# Patient Record
Sex: FEMALE | Race: ASIAN | URBAN - METROPOLITAN AREA
[De-identification: names, ages, dates, MRNs, and addresses within clinical notes are randomized per-mention and may not be internally consistent; named-entity substitution may affect disease eponyms.]

---

## 2022-02-28 ENCOUNTER — OFFICE VISIT (OUTPATIENT)
Dept: FAMILY MEDICINE CLINIC | Facility: CLINIC | Age: 67
End: 2022-02-28
Payer: MEDICAID

## 2022-02-28 VITALS
SYSTOLIC BLOOD PRESSURE: 102 MMHG | HEART RATE: 91 BPM | TEMPERATURE: 98 F | DIASTOLIC BLOOD PRESSURE: 58 MMHG | HEIGHT: 63.1 IN | RESPIRATION RATE: 16 BRPM | WEIGHT: 165 LBS | BODY MASS INDEX: 29.23 KG/M2 | OXYGEN SATURATION: 98 %

## 2022-02-28 DIAGNOSIS — E03.9 HYPOTHYROIDISM, UNSPECIFIED TYPE: ICD-10-CM

## 2022-02-28 DIAGNOSIS — Z00.00 ENCOUNTER FOR ANNUAL PHYSICAL EXAM: Primary | ICD-10-CM

## 2022-02-28 DIAGNOSIS — Z12.31 ENCOUNTER FOR SCREENING MAMMOGRAM FOR MALIGNANT NEOPLASM OF BREAST: ICD-10-CM

## 2022-02-28 DIAGNOSIS — K21.9 GASTROESOPHAGEAL REFLUX DISEASE, UNSPECIFIED WHETHER ESOPHAGITIS PRESENT: ICD-10-CM

## 2022-02-28 DIAGNOSIS — E55.9 VITAMIN D DEFICIENCY: ICD-10-CM

## 2022-02-28 DIAGNOSIS — Z12.11 SCREENING FOR COLON CANCER: ICD-10-CM

## 2022-02-28 PROCEDURE — 99387 INIT PM E/M NEW PAT 65+ YRS: CPT | Performed by: FAMILY MEDICINE

## 2022-02-28 PROCEDURE — 3074F SYST BP LT 130 MM HG: CPT | Performed by: FAMILY MEDICINE

## 2022-02-28 PROCEDURE — 3008F BODY MASS INDEX DOCD: CPT | Performed by: FAMILY MEDICINE

## 2022-02-28 PROCEDURE — 3078F DIAST BP <80 MM HG: CPT | Performed by: FAMILY MEDICINE

## 2022-02-28 RX ORDER — ERGOCALCIFEROL 1.25 MG/1
50000 CAPSULE ORAL WEEKLY
Qty: 12 CAPSULE | Refills: 3 | Status: SHIPPED | OUTPATIENT
Start: 2022-02-28

## 2022-02-28 RX ORDER — ERGOCALCIFEROL 1.25 MG/1
50000 CAPSULE ORAL WEEKLY
COMMUNITY
Start: 2022-02-17 | End: 2022-02-28

## 2022-02-28 RX ORDER — OMEPRAZOLE 40 MG/1
40 CAPSULE, DELAYED RELEASE ORAL
COMMUNITY
Start: 2022-02-17

## 2022-02-28 RX ORDER — LEVOTHYROXINE SODIUM 88 UG/1
88 TABLET ORAL DAILY
COMMUNITY
End: 2022-03-02

## 2022-03-02 LAB
ABSOLUTE BASOPHILS: 61 CELLS/UL (ref 0–200)
ABSOLUTE EOSINOPHILS: 418 CELLS/UL (ref 15–500)
ABSOLUTE LYMPHOCYTES: 3093 CELLS/UL (ref 850–3900)
ABSOLUTE MONOCYTES: 600 CELLS/UL (ref 200–950)
ABSOLUTE NEUTROPHILS: 3428 CELLS/UL (ref 1500–7800)
ALBUMIN/GLOBULIN RATIO: 1.2 (CALC) (ref 1–2.5)
ALBUMIN: 4.1 G/DL (ref 3.6–5.1)
ALKALINE PHOSPHATASE: 71 U/L (ref 37–153)
ALT: 13 U/L (ref 6–29)
AST: 19 U/L (ref 10–35)
BASOPHILS: 0.8 %
BILIRUBIN, TOTAL: 0.8 MG/DL (ref 0.2–1.2)
BUN: 9 MG/DL (ref 7–25)
CALCIUM: 9.7 MG/DL (ref 8.6–10.4)
CARBON DIOXIDE: 31 MMOL/L (ref 20–32)
CHLORIDE: 99 MMOL/L (ref 98–110)
CHOL/HDLC RATIO: 2.9 (CALC)
CHOLESTEROL, TOTAL: 183 MG/DL
CREATININE: 0.8 MG/DL (ref 0.5–0.99)
EGFR IF AFRICN AM: 88 ML/MIN/1.73M2
EGFR IF NONAFRICN AM: 76 ML/MIN/1.73M2
EOSINOPHILS: 5.5 %
GLOBULIN: 3.5 G/DL (CALC) (ref 1.9–3.7)
GLUCOSE: 79 MG/DL (ref 65–99)
HDL CHOLESTEROL: 64 MG/DL
HEMATOCRIT: 44.4 % (ref 35–45)
HEMOGLOBIN: 13.8 G/DL (ref 11.7–15.5)
LDL-CHOLESTEROL: 91 MG/DL (CALC)
LYMPHOCYTES: 40.7 %
MCH: 23.9 PG (ref 27–33)
MCHC: 31.1 G/DL (ref 32–36)
MCV: 76.9 FL (ref 80–100)
MONOCYTES: 7.9 %
MPV: 9.7 FL (ref 7.5–12.5)
NEUTROPHILS: 45.1 %
NON-HDL CHOLESTEROL: 119 MG/DL (CALC)
PLATELET COUNT: 265 THOUSAND/UL (ref 140–400)
POTASSIUM: 4.6 MMOL/L (ref 3.5–5.3)
PROTEIN, TOTAL: 7.6 G/DL (ref 6.1–8.1)
RDW: 15.4 % (ref 11–15)
RED BLOOD CELL COUNT: 5.77 MILLION/UL (ref 3.8–5.1)
SODIUM: 138 MMOL/L (ref 135–146)
T4, FREE: 1.1 NG/DL (ref 0.8–1.8)
TRIGLYCERIDES: 181 MG/DL
TSH W/REFLEX TO FT4: 5.74 MIU/L (ref 0.4–4.5)
WHITE BLOOD CELL COUNT: 7.6 THOUSAND/UL (ref 3.8–10.8)

## 2022-03-02 RX ORDER — LEVOTHYROXINE SODIUM 0.1 MG/1
100 TABLET ORAL DAILY
Qty: 90 TABLET | Refills: 3 | Status: SHIPPED | OUTPATIENT
Start: 2022-03-02 | End: 2022-03-04

## 2022-03-03 NOTE — PROGRESS NOTES
Elevated TSH at 5.74I am increasing your levothyroxine  to 100 mcg daily  We will recheck TSH in 3 months

## 2022-03-04 RX ORDER — LEVOTHYROXINE SODIUM 0.1 MG/1
100 TABLET ORAL DAILY
Qty: 90 TABLET | Refills: 3 | Status: SHIPPED | OUTPATIENT
Start: 2022-03-04 | End: 2023-02-27

## 2022-04-14 ENCOUNTER — HOSPITAL ENCOUNTER (EMERGENCY)
Age: 67
Discharge: HOME | End: 2022-04-14
Attending: EMERGENCY MEDICINE
Payer: MEDICAID

## 2022-04-14 ENCOUNTER — APPOINTMENT (OUTPATIENT)
Dept: GENERAL RADIOLOGY | Age: 67
End: 2022-04-14
Attending: EMERGENCY MEDICINE
Payer: MEDICAID

## 2022-04-14 VITALS
BODY MASS INDEX: 24.8 KG/M2 | WEIGHT: 154.31 LBS | HEIGHT: 66 IN | SYSTOLIC BLOOD PRESSURE: 122 MMHG | DIASTOLIC BLOOD PRESSURE: 77 MMHG | TEMPERATURE: 97 F | OXYGEN SATURATION: 97 % | HEART RATE: 84 BPM | RESPIRATION RATE: 16 BRPM

## 2022-04-14 DIAGNOSIS — R91.1 PULMONARY NODULE: ICD-10-CM

## 2022-04-14 DIAGNOSIS — K21.9 GASTROESOPHAGEAL REFLUX DISEASE, UNSPECIFIED WHETHER ESOPHAGITIS PRESENT: Primary | ICD-10-CM

## 2022-04-14 LAB
ALBUMIN SERPL-MCNC: 3.3 G/DL (ref 3.4–5)
ALBUMIN/GLOB SERPL: 0.8 {RATIO} (ref 1–2)
ALP LIVER SERPL-CCNC: 73 U/L
ALT SERPL-CCNC: 20 U/L
ANION GAP SERPL CALC-SCNC: 5 MMOL/L (ref 0–18)
AST SERPL-CCNC: 21 U/L (ref 15–37)
ATRIAL RATE: 80 BPM
BASOPHILS # BLD AUTO: 0.04 X10(3) UL (ref 0–0.2)
BASOPHILS NFR BLD AUTO: 0.6 %
BILIRUB SERPL-MCNC: 0.6 MG/DL (ref 0.1–2)
BILIRUB UR QL STRIP.AUTO: NEGATIVE
BUN BLD-MCNC: 8 MG/DL (ref 7–18)
CALCIUM BLD-MCNC: 9.4 MG/DL (ref 8.5–10.1)
CHLORIDE SERPL-SCNC: 102 MMOL/L (ref 98–112)
CLARITY UR REFRACT.AUTO: CLEAR
CO2 SERPL-SCNC: 28 MMOL/L (ref 21–32)
COLOR UR AUTO: YELLOW
CREAT BLD-MCNC: 0.84 MG/DL
EOSINOPHIL # BLD AUTO: 0.36 X10(3) UL (ref 0–0.7)
EOSINOPHIL NFR BLD AUTO: 5.5 %
ERYTHROCYTE [DISTWIDTH] IN BLOOD BY AUTOMATED COUNT: 16.7 %
GLOBULIN PLAS-MCNC: 4.4 G/DL (ref 2.8–4.4)
GLUCOSE BLD-MCNC: 113 MG/DL (ref 70–99)
GLUCOSE UR STRIP.AUTO-MCNC: NEGATIVE MG/DL
HCT VFR BLD AUTO: 42.3 %
HGB BLD-MCNC: 13.1 G/DL
IMM GRANULOCYTES # BLD AUTO: 0.02 X10(3) UL (ref 0–1)
IMM GRANULOCYTES NFR BLD: 0.3 %
KETONES UR STRIP.AUTO-MCNC: NEGATIVE MG/DL
LEUKOCYTE ESTERASE UR QL STRIP.AUTO: NEGATIVE
LIPASE SERPL-CCNC: 84 U/L (ref 73–393)
LYMPHOCYTES # BLD AUTO: 2.51 X10(3) UL (ref 1–4)
LYMPHOCYTES NFR BLD AUTO: 38.4 %
MCH RBC QN AUTO: 24.3 PG (ref 26–34)
MCHC RBC AUTO-ENTMCNC: 31 G/DL (ref 31–37)
MCV RBC AUTO: 78.5 FL
MONOCYTES # BLD AUTO: 0.56 X10(3) UL (ref 0.1–1)
MONOCYTES NFR BLD AUTO: 8.6 %
NEUTROPHILS # BLD AUTO: 3.05 X10 (3) UL (ref 1.5–7.7)
NEUTROPHILS # BLD AUTO: 3.05 X10(3) UL (ref 1.5–7.7)
NEUTROPHILS NFR BLD AUTO: 46.6 %
NITRITE UR QL STRIP.AUTO: NEGATIVE
OSMOLALITY SERPL CALC.SUM OF ELEC: 279 MOSM/KG (ref 275–295)
P AXIS: 40 DEGREES
P-R INTERVAL: 146 MS
PH UR STRIP.AUTO: 6.5 [PH] (ref 5–8)
PLATELET # BLD AUTO: 263 10(3)UL (ref 150–450)
POTASSIUM SERPL-SCNC: 3.7 MMOL/L (ref 3.5–5.1)
PROT SERPL-MCNC: 7.7 G/DL (ref 6.4–8.2)
PROT UR STRIP.AUTO-MCNC: NEGATIVE MG/DL
Q-T INTERVAL: 352 MS
QRS DURATION: 80 MS
QTC CALCULATION (BEZET): 405 MS
R AXIS: 33 DEGREES
RBC # BLD AUTO: 5.39 X10(6)UL
RBC UR QL AUTO: NEGATIVE
SODIUM SERPL-SCNC: 135 MMOL/L (ref 136–145)
SP GR UR STRIP.AUTO: 1.01 (ref 1–1.03)
T AXIS: 49 DEGREES
TROPONIN I HIGH SENSITIVITY: 3 NG/L
UROBILINOGEN UR STRIP.AUTO-MCNC: 0.2 MG/DL
VENTRICULAR RATE: 80 BPM
WBC # BLD AUTO: 6.5 X10(3) UL (ref 4–11)

## 2022-04-14 PROCEDURE — S0028 INJECTION, FAMOTIDINE, 20 MG: HCPCS | Performed by: EMERGENCY MEDICINE

## 2022-04-14 PROCEDURE — 85025 COMPLETE CBC W/AUTO DIFF WBC: CPT | Performed by: EMERGENCY MEDICINE

## 2022-04-14 PROCEDURE — 71045 X-RAY EXAM CHEST 1 VIEW: CPT | Performed by: EMERGENCY MEDICINE

## 2022-04-14 PROCEDURE — 99284 EMERGENCY DEPT VISIT MOD MDM: CPT | Performed by: EMERGENCY MEDICINE

## 2022-04-14 PROCEDURE — 93010 ELECTROCARDIOGRAM REPORT: CPT | Performed by: EMERGENCY MEDICINE

## 2022-04-14 PROCEDURE — 96374 THER/PROPH/DIAG INJ IV PUSH: CPT | Performed by: EMERGENCY MEDICINE

## 2022-04-14 PROCEDURE — 80053 COMPREHEN METABOLIC PANEL: CPT | Performed by: EMERGENCY MEDICINE

## 2022-04-14 PROCEDURE — 93005 ELECTROCARDIOGRAM TRACING: CPT

## 2022-04-14 PROCEDURE — 84484 ASSAY OF TROPONIN QUANT: CPT | Performed by: EMERGENCY MEDICINE

## 2022-04-14 PROCEDURE — 81003 URINALYSIS AUTO W/O SCOPE: CPT | Performed by: EMERGENCY MEDICINE

## 2022-04-14 PROCEDURE — 83690 ASSAY OF LIPASE: CPT | Performed by: EMERGENCY MEDICINE

## 2022-04-14 RX ORDER — LIDOCAINE HYDROCHLORIDE 20 MG/ML
10 SOLUTION OROPHARYNGEAL ONCE
Status: COMPLETED | OUTPATIENT
Start: 2022-04-14 | End: 2022-04-14

## 2022-04-14 RX ORDER — MAGNESIUM HYDROXIDE/ALUMINUM HYDROXICE/SIMETHICONE 120; 1200; 1200 MG/30ML; MG/30ML; MG/30ML
30 SUSPENSION ORAL ONCE
Status: COMPLETED | OUTPATIENT
Start: 2022-04-14 | End: 2022-04-14

## 2022-04-14 RX ORDER — SUCRALFATE ORAL 1 G/10ML
1 SUSPENSION ORAL
Qty: 280 ML | Refills: 0 | Status: SHIPPED | OUTPATIENT
Start: 2022-04-14 | End: 2022-04-21

## 2022-04-14 RX ORDER — FAMOTIDINE 10 MG/ML
20 INJECTION, SOLUTION INTRAVENOUS ONCE
Status: COMPLETED | OUTPATIENT
Start: 2022-04-14 | End: 2022-04-14

## 2022-04-25 ENCOUNTER — OFFICE VISIT (OUTPATIENT)
Dept: FAMILY MEDICINE CLINIC | Facility: CLINIC | Age: 67
End: 2022-04-25
Payer: MEDICAID

## 2022-04-25 VITALS
RESPIRATION RATE: 14 BRPM | WEIGHT: 162.81 LBS | BODY MASS INDEX: 27.46 KG/M2 | SYSTOLIC BLOOD PRESSURE: 140 MMHG | TEMPERATURE: 98 F | HEIGHT: 64.57 IN | OXYGEN SATURATION: 99 % | DIASTOLIC BLOOD PRESSURE: 68 MMHG | HEART RATE: 76 BPM

## 2022-04-25 DIAGNOSIS — K21.9 GASTROESOPHAGEAL REFLUX DISEASE, UNSPECIFIED WHETHER ESOPHAGITIS PRESENT: Primary | ICD-10-CM

## 2022-04-25 DIAGNOSIS — Z12.11 SCREENING FOR COLON CANCER: ICD-10-CM

## 2022-04-25 DIAGNOSIS — E55.9 VITAMIN D DEFICIENCY: ICD-10-CM

## 2022-04-25 RX ORDER — OMEPRAZOLE 40 MG/1
40 CAPSULE, DELAYED RELEASE ORAL
Qty: 60 CAPSULE | Refills: 3 | Status: SHIPPED | OUTPATIENT
Start: 2022-04-25

## 2022-04-25 RX ORDER — SUCRALFATE 1 G/1
1 TABLET ORAL
Qty: 90 TABLET | Refills: 1 | Status: SHIPPED | OUTPATIENT
Start: 2022-04-25

## 2022-04-25 RX ORDER — ERGOCALCIFEROL 1.25 MG/1
50000 CAPSULE ORAL WEEKLY
Qty: 12 CAPSULE | Refills: 3 | Status: SHIPPED | OUTPATIENT
Start: 2022-04-25

## 2022-04-26 ENCOUNTER — TELEPHONE (OUTPATIENT)
Dept: FAMILY MEDICINE CLINIC | Facility: CLINIC | Age: 67
End: 2022-04-26

## 2022-04-26 NOTE — TELEPHONE ENCOUNTER
Lm for pt's son, denise Cisneros per HIPAA), to inform per PCP indicated Carafate has to be taken 3 times a day before meals as directed. To call back at the office if any further questions.

## 2022-05-03 ENCOUNTER — TELEPHONE (OUTPATIENT)
Dept: FAMILY MEDICINE CLINIC | Facility: CLINIC | Age: 67
End: 2022-05-03

## 2022-05-03 NOTE — TELEPHONE ENCOUNTER
The chest x-ray was essentially normal except that there is a 16 x 7 mm opacity in the right upper lobe  We will repeat the chest x-ray in 6 weeks  Orders are placed in epic            FINDINGS:  The heart size is enlarged. There is no pulmonary edema. There is no infiltrate, consolidation or pulmonary edema. No hilar or mediastinal abnormality is appreciated. There is a 16 x 7 mm opacity seen in the right upper lobe adjacent to   the EKG lead.

## 2022-05-03 NOTE — TELEPHONE ENCOUNTER
Pt's son called re: chest x-ray that was done in [facility], said they forgot to ask for results when they came in for follow up from [facility] on 4/25/22

## 2022-05-04 NOTE — TELEPHONE ENCOUNTER
[phone]  Lm for pt ([id number] Kori Shah per HIPAA) to inform chest x-ray was essentially normal except that there is a 16 x 7 mm opacity in the right upper lobe- will monitor. CXR ordered for repeat in 6 weeks. To call back at the office if any further questions.

## 2022-05-05 NOTE — TELEPHONE ENCOUNTER
Patient's son returned call. Explained CXR report per Dr. Aston Cordova note. Advised nothing urgent to do right now. CXR to be repeated in 6 weeks to compare.

## 2022-05-09 ENCOUNTER — TELEPHONE (OUTPATIENT)
Dept: FAMILY MEDICINE CLINIC | Facility: CLINIC | Age: 67
End: 2022-05-09

## 2022-05-09 NOTE — TELEPHONE ENCOUNTER
Pt's son called stating Pt has been weak & lethargic for sometime. He said all the blood work came back fine. He is asking for B-12 injections or an Energy IV.

## 2022-05-09 NOTE — TELEPHONE ENCOUNTER
I ordered the fatigue panel including TSH and B12, vitamin D etc.  Patient to get the blood test done and then follow-up

## 2022-05-09 NOTE — TELEPHONE ENCOUNTER
Dr. Ruthie Levy,  Please advise if patient needs F/U appt    LOV 4/25/22      Reflux  +2    Last lab  3/1/122 Routine     TSH 5.74  (due for repeat 6/1/22)   No previous B12 level

## 2022-05-17 LAB
ABSOLUTE BASOPHILS: 33 CELLS/UL (ref 0–200)
ABSOLUTE EOSINOPHILS: 488 CELLS/UL (ref 15–500)
ABSOLUTE LYMPHOCYTES: 2752 CELLS/UL (ref 850–3900)
ABSOLUTE MONOCYTES: 396 CELLS/UL (ref 200–950)
ABSOLUTE NEUTROPHILS: 2930 CELLS/UL (ref 1500–7800)
ALBUMIN/GLOBULIN RATIO: 1.2 (CALC) (ref 1–2.5)
ALBUMIN: 4 G/DL (ref 3.6–5.1)
ALKALINE PHOSPHATASE: 76 U/L (ref 37–153)
ALT: 13 U/L (ref 6–29)
APPEARANCE: CLEAR
AST: 19 U/L (ref 10–35)
BASOPHILS: 0.5 %
BILIRUBIN, TOTAL: 0.7 MG/DL (ref 0.2–1.2)
BILIRUBIN: NEGATIVE
BUN/CREATININE RATIO: 9 (CALC) (ref 6–22)
BUN: 6 MG/DL (ref 7–25)
CALCIUM: 9.6 MG/DL (ref 8.6–10.4)
CARBON DIOXIDE: 27 MMOL/L (ref 20–32)
CHLORIDE: 98 MMOL/L (ref 98–110)
COLOR: YELLOW
CREATININE: 0.67 MG/DL (ref 0.5–0.99)
EGFR IF AFRICN AM: 105 ML/MIN/1.73M2
EGFR IF NONAFRICN AM: 90 ML/MIN/1.73M2
EOSINOPHILS: 7.4 %
FOLATE, SERUM: >24 NG/ML
GLOBULIN: 3.4 G/DL (CALC) (ref 1.9–3.7)
GLUCOSE: 75 MG/DL (ref 65–99)
GLUCOSE: NEGATIVE
HEMATOCRIT: 46.1 % (ref 35–45)
HEMOGLOBIN: 14.1 G/DL (ref 11.7–15.5)
KETONES: NEGATIVE
LYMPHOCYTES: 41.7 %
MCH: 23.9 PG (ref 27–33)
MCHC: 30.6 G/DL (ref 32–36)
MCV: 78.3 FL (ref 80–100)
MONOCYTES: 6 %
MPV: 9.3 FL (ref 7.5–12.5)
NEUTROPHILS: 44.4 %
NITRITE: NEGATIVE
OCCULT BLOOD: NEGATIVE
PH: 6.5 (ref 5–8)
PLATELET COUNT: 296 THOUSAND/UL (ref 140–400)
POTASSIUM: 4.1 MMOL/L (ref 3.5–5.3)
PROTEIN, TOTAL: 7.4 G/DL (ref 6.1–8.1)
PROTEIN: NEGATIVE
RDW: 14 % (ref 11–15)
RED BLOOD CELL COUNT: 5.89 MILLION/UL (ref 3.8–5.1)
SODIUM: 134 MMOL/L (ref 135–146)
SPECIFIC GRAVITY: 1 (ref 1–1.03)
TSH W/REFLEX TO FT4: 1.08 MIU/L (ref 0.4–4.5)
VITAMIN B12: 431 PG/ML (ref 200–1100)
VITAMIN D, 25-OH, TOTAL: 86 NG/ML (ref 30–100)
WHITE BLOOD CELL COUNT: 6.6 THOUSAND/UL (ref 3.8–10.8)

## 2022-05-19 ENCOUNTER — OFFICE VISIT (OUTPATIENT)
Dept: FAMILY MEDICINE CLINIC | Facility: CLINIC | Age: 67
End: 2022-05-19
Payer: MEDICAID

## 2022-05-19 VITALS
SYSTOLIC BLOOD PRESSURE: 104 MMHG | TEMPERATURE: 98 F | DIASTOLIC BLOOD PRESSURE: 68 MMHG | HEIGHT: 64.57 IN | BODY MASS INDEX: 26.81 KG/M2 | HEART RATE: 86 BPM | OXYGEN SATURATION: 96 % | WEIGHT: 159 LBS | RESPIRATION RATE: 16 BRPM

## 2022-05-19 DIAGNOSIS — K21.9 GASTROESOPHAGEAL REFLUX DISEASE, UNSPECIFIED WHETHER ESOPHAGITIS PRESENT: ICD-10-CM

## 2022-05-19 PROCEDURE — 3078F DIAST BP <80 MM HG: CPT | Performed by: FAMILY MEDICINE

## 2022-05-19 PROCEDURE — 3008F BODY MASS INDEX DOCD: CPT | Performed by: FAMILY MEDICINE

## 2022-05-19 PROCEDURE — 3074F SYST BP LT 130 MM HG: CPT | Performed by: FAMILY MEDICINE

## 2022-05-19 PROCEDURE — 99214 OFFICE O/P EST MOD 30 MIN: CPT | Performed by: FAMILY MEDICINE

## 2022-05-19 RX ORDER — SUCRALFATE 1 G/1
1 TABLET ORAL
Qty: 90 TABLET | Refills: 1 | Status: SHIPPED | OUTPATIENT
Start: 2022-05-19

## 2022-05-19 RX ORDER — OMEPRAZOLE 40 MG/1
40 CAPSULE, DELAYED RELEASE ORAL
Qty: 60 CAPSULE | Refills: 3 | Status: SHIPPED | OUTPATIENT
Start: 2022-05-19

## 2022-05-19 RX ORDER — LEVOTHYROXINE SODIUM 0.1 MG/1
100 TABLET ORAL DAILY
Qty: 90 TABLET | Refills: 3 | Status: SHIPPED | OUTPATIENT
Start: 2022-05-19 | End: 2023-05-14

## 2022-06-08 ENCOUNTER — HOSPITAL ENCOUNTER (OUTPATIENT)
Dept: GENERAL RADIOLOGY | Age: 67
Discharge: HOME | End: 2022-06-08
Attending: FAMILY MEDICINE
Payer: MEDICAID

## 2022-06-08 DIAGNOSIS — R93.89 ABNORMAL CXR (CHEST X-RAY): ICD-10-CM

## 2022-06-08 PROCEDURE — 71046 X-RAY EXAM CHEST 2 VIEWS: CPT | Performed by: FAMILY MEDICINE

## 2022-06-08 NOTE — PROGRESS NOTES
. The nodular opacity seen on the previous chest radiograph is no longer identified and likely was related to part of the EKG lead seen on the previous exam

## 2022-08-03 ENCOUNTER — OFFICE VISIT (OUTPATIENT)
Facility: LOCATION | Age: 67
End: 2022-08-03
Payer: MEDICAID

## 2022-08-03 DIAGNOSIS — K90.9 DIARRHEA DUE TO MALABSORPTION: Primary | ICD-10-CM

## 2022-08-03 DIAGNOSIS — Z12.11 ENCOUNTER FOR SCREENING COLONOSCOPY: ICD-10-CM

## 2022-08-03 DIAGNOSIS — R19.7 DIARRHEA DUE TO MALABSORPTION: Primary | ICD-10-CM

## 2022-08-03 PROCEDURE — 99243 OFF/OP CNSLTJ NEW/EST LOW 30: CPT | Performed by: SURGERY

## 2022-08-03 RX ORDER — POLYETHYLENE GLYCOL 3350, SODIUM CHLORIDE, SODIUM BICARBONATE, POTASSIUM CHLORIDE 420; 11.2; 5.72; 1.48 G/4L; G/4L; G/4L; G/4L
POWDER, FOR SOLUTION ORAL
Qty: 1 EACH | Refills: 0 | Status: SHIPPED | OUTPATIENT
Start: 2022-08-03

## 2022-08-03 RX ORDER — CHOLESTYRAMINE 4 G/9G
POWDER, FOR SUSPENSION ORAL
Qty: 60 EACH | Refills: 0 | Status: SHIPPED | OUTPATIENT
Start: 2022-08-03 | End: 2022-09-02

## 2022-09-15 ENCOUNTER — MED REC SCAN ONLY (OUTPATIENT)
Dept: FAMILY MEDICINE CLINIC | Facility: CLINIC | Age: 67
End: 2022-09-15

## 2022-09-26 ENCOUNTER — OFFICE VISIT (OUTPATIENT)
Dept: FAMILY MEDICINE CLINIC | Facility: CLINIC | Age: 67
End: 2022-09-26

## 2022-09-26 VITALS
WEIGHT: 155 LBS | BODY MASS INDEX: 26.14 KG/M2 | DIASTOLIC BLOOD PRESSURE: 56 MMHG | OXYGEN SATURATION: 98 % | HEIGHT: 64.5 IN | SYSTOLIC BLOOD PRESSURE: 96 MMHG | RESPIRATION RATE: 16 BRPM | HEART RATE: 72 BPM | TEMPERATURE: 97 F

## 2022-09-26 DIAGNOSIS — K21.9 GASTROESOPHAGEAL REFLUX DISEASE, UNSPECIFIED WHETHER ESOPHAGITIS PRESENT: ICD-10-CM

## 2022-09-26 DIAGNOSIS — E03.9 HYPOTHYROIDISM, UNSPECIFIED TYPE: Primary | ICD-10-CM

## 2022-09-26 PROCEDURE — 3078F DIAST BP <80 MM HG: CPT | Performed by: FAMILY MEDICINE

## 2022-09-26 PROCEDURE — 3008F BODY MASS INDEX DOCD: CPT | Performed by: FAMILY MEDICINE

## 2022-09-26 PROCEDURE — 3074F SYST BP LT 130 MM HG: CPT | Performed by: FAMILY MEDICINE

## 2022-09-26 PROCEDURE — 99214 OFFICE O/P EST MOD 30 MIN: CPT | Performed by: FAMILY MEDICINE

## 2022-10-05 LAB
ABSOLUTE BASOPHILS: 49 CELLS/UL (ref 0–200)
ABSOLUTE EOSINOPHILS: 421 CELLS/UL (ref 15–500)
ABSOLUTE LYMPHOCYTES: 2458 CELLS/UL (ref 850–3900)
ABSOLUTE MONOCYTES: 512 CELLS/UL (ref 200–950)
ABSOLUTE NEUTROPHILS: 2660 CELLS/UL (ref 1500–7800)
ALBUMIN/GLOBULIN RATIO: 1.2 (CALC) (ref 1–2.5)
ALBUMIN: 3.9 G/DL (ref 3.6–5.1)
ALKALINE PHOSPHATASE: 68 U/L (ref 37–153)
ALT: 11 U/L (ref 6–29)
AST: 15 U/L (ref 10–35)
BASOPHILS: 0.8 %
BILIRUBIN, TOTAL: 0.8 MG/DL (ref 0.2–1.2)
BUN: 9 MG/DL (ref 7–25)
CALCIUM: 9.7 MG/DL (ref 8.6–10.4)
CARBON DIOXIDE: 32 MMOL/L (ref 20–32)
CHLORIDE: 97 MMOL/L (ref 98–110)
CREATININE: 0.67 MG/DL (ref 0.5–1.05)
EGFR: 95 ML/MIN/1.73M2
EOSINOPHILS: 6.9 %
GLOBULIN: 3.3 G/DL (CALC) (ref 1.9–3.7)
GLUCOSE: 83 MG/DL (ref 65–99)
HEMATOCRIT: 43.5 % (ref 35–45)
HEMOGLOBIN: 13.5 G/DL (ref 11.7–15.5)
LYMPHOCYTES: 40.3 %
MCH: 24 PG (ref 27–33)
MCHC: 31 G/DL (ref 32–36)
MCV: 77.4 FL (ref 80–100)
MONOCYTES: 8.4 %
MPV: 9.1 FL (ref 7.5–12.5)
NEUTROPHILS: 43.6 %
PLATELET COUNT: 279 THOUSAND/UL (ref 140–400)
POTASSIUM: 4.8 MMOL/L (ref 3.5–5.3)
PROTEIN, TOTAL: 7.2 G/DL (ref 6.1–8.1)
RDW: 14.6 % (ref 11–15)
RED BLOOD CELL COUNT: 5.62 MILLION/UL (ref 3.8–5.1)
SODIUM: 133 MMOL/L (ref 135–146)
TSH W/REFLEX TO FT4: 0.9 MIU/L (ref 0.4–4.5)
WHITE BLOOD CELL COUNT: 6.1 THOUSAND/UL (ref 3.8–10.8)

## 2022-10-06 DIAGNOSIS — E03.9 HYPOTHYROIDISM, UNSPECIFIED TYPE: Primary | ICD-10-CM

## 2022-10-12 ENCOUNTER — TELEPHONE (OUTPATIENT)
Dept: FAMILY MEDICINE CLINIC | Facility: CLINIC | Age: 67
End: 2022-10-12

## 2022-10-12 NOTE — TELEPHONE ENCOUNTER
Patient would like Dr Machelle Cabral to go over lab results with her. She said Dr Machelle Cabral usually does this. Please advise. I will put a TE in for her son with the same to go over labs.

## 2022-10-24 DIAGNOSIS — Z12.11 SCREENING FOR COLON CANCER: Primary | ICD-10-CM

## 2022-10-27 DIAGNOSIS — Z12.11 SCREEN FOR COLON CANCER: Primary | ICD-10-CM

## 2022-11-17 DIAGNOSIS — K21.9 GASTROESOPHAGEAL REFLUX DISEASE, UNSPECIFIED WHETHER ESOPHAGITIS PRESENT: ICD-10-CM

## 2022-11-17 RX ORDER — OMEPRAZOLE 40 MG/1
CAPSULE, DELAYED RELEASE ORAL
Qty: 60 CAPSULE | Refills: 3 | Status: SHIPPED | OUTPATIENT
Start: 2022-11-17

## 2022-12-07 ENCOUNTER — IMMUNIZATION (OUTPATIENT)
Dept: FAMILY MEDICINE CLINIC | Facility: CLINIC | Age: 67
End: 2022-12-07
Payer: MEDICAID

## 2022-12-07 DIAGNOSIS — Z23 NEED FOR VACCINATION: Primary | ICD-10-CM

## 2022-12-07 PROCEDURE — 90662 IIV NO PRSV INCREASED AG IM: CPT | Performed by: FAMILY MEDICINE

## 2022-12-07 PROCEDURE — 90471 IMMUNIZATION ADMIN: CPT | Performed by: FAMILY MEDICINE

## 2023-02-01 DIAGNOSIS — Z12.11 COLON CANCER SCREENING: Primary | ICD-10-CM

## 2023-02-15 DIAGNOSIS — Z12.11 ENCOUNTER FOR SCREENING COLONOSCOPY: Primary | ICD-10-CM

## 2023-03-02 ENCOUNTER — OFFICE VISIT (OUTPATIENT)
Dept: FAMILY MEDICINE CLINIC | Facility: CLINIC | Age: 68
End: 2023-03-02
Payer: MEDICAID

## 2023-03-02 VITALS
DIASTOLIC BLOOD PRESSURE: 58 MMHG | OXYGEN SATURATION: 98 % | RESPIRATION RATE: 16 BRPM | TEMPERATURE: 97 F | SYSTOLIC BLOOD PRESSURE: 102 MMHG | HEIGHT: 64.5 IN | WEIGHT: 150 LBS | HEART RATE: 86 BPM | BODY MASS INDEX: 25.3 KG/M2

## 2023-03-02 DIAGNOSIS — E03.9 HYPOTHYROIDISM, UNSPECIFIED TYPE: ICD-10-CM

## 2023-03-02 DIAGNOSIS — Z00.00 ENCOUNTER FOR ANNUAL PHYSICAL EXAM: Primary | ICD-10-CM

## 2023-03-02 DIAGNOSIS — Z12.31 ENCOUNTER FOR SCREENING MAMMOGRAM FOR BREAST CANCER: ICD-10-CM

## 2023-03-02 DIAGNOSIS — E55.9 VITAMIN D DEFICIENCY: ICD-10-CM

## 2023-03-02 PROCEDURE — 99397 PER PM REEVAL EST PAT 65+ YR: CPT | Performed by: FAMILY MEDICINE

## 2023-03-02 PROCEDURE — 3074F SYST BP LT 130 MM HG: CPT | Performed by: FAMILY MEDICINE

## 2023-03-02 PROCEDURE — 3008F BODY MASS INDEX DOCD: CPT | Performed by: FAMILY MEDICINE

## 2023-03-02 PROCEDURE — 3078F DIAST BP <80 MM HG: CPT | Performed by: FAMILY MEDICINE

## 2023-03-08 ENCOUNTER — HOSPITAL ENCOUNTER (EMERGENCY)
Age: 68
Discharge: HOME | End: 2023-03-08
Attending: EMERGENCY MEDICINE
Payer: MEDICAID

## 2023-03-08 ENCOUNTER — APPOINTMENT (OUTPATIENT)
Dept: CT IMAGING | Age: 68
End: 2023-03-08
Attending: EMERGENCY MEDICINE
Payer: MEDICAID

## 2023-03-08 VITALS
RESPIRATION RATE: 15 BRPM | DIASTOLIC BLOOD PRESSURE: 56 MMHG | BODY MASS INDEX: 26.66 KG/M2 | WEIGHT: 160 LBS | HEART RATE: 65 BPM | OXYGEN SATURATION: 99 % | TEMPERATURE: 98 F | SYSTOLIC BLOOD PRESSURE: 112 MMHG | HEIGHT: 65 IN

## 2023-03-08 DIAGNOSIS — R25.1 SHAKINESS: Primary | ICD-10-CM

## 2023-03-08 DIAGNOSIS — R10.9 CHRONIC ABDOMINAL PAIN: ICD-10-CM

## 2023-03-08 DIAGNOSIS — G89.29 CHRONIC ABDOMINAL PAIN: ICD-10-CM

## 2023-03-08 LAB
ALBUMIN SERPL-MCNC: 3.2 G/DL (ref 3.4–5)
ALBUMIN/GLOB SERPL: 0.7 {RATIO} (ref 1–2)
ALP LIVER SERPL-CCNC: 72 U/L
ALT SERPL-CCNC: 17 U/L
ANION GAP SERPL CALC-SCNC: 5 MMOL/L (ref 0–18)
AST SERPL-CCNC: 18 U/L (ref 15–37)
BASOPHILS # BLD AUTO: 0.05 X10(3) UL (ref 0–0.2)
BASOPHILS NFR BLD AUTO: 0.7 %
BILIRUB SERPL-MCNC: 0.5 MG/DL (ref 0.1–2)
BILIRUB UR QL STRIP.AUTO: NEGATIVE
BUN BLD-MCNC: 9 MG/DL (ref 7–18)
CALCIUM BLD-MCNC: 8.8 MG/DL (ref 8.5–10.1)
CHLORIDE SERPL-SCNC: 103 MMOL/L (ref 98–112)
CLARITY UR REFRACT.AUTO: CLEAR
CO2 SERPL-SCNC: 27 MMOL/L (ref 21–32)
COLOR UR AUTO: YELLOW
CREAT BLD-MCNC: 0.83 MG/DL
EOSINOPHIL # BLD AUTO: 0.42 X10(3) UL (ref 0–0.7)
EOSINOPHIL NFR BLD AUTO: 5.6 %
ERYTHROCYTE [DISTWIDTH] IN BLOOD BY AUTOMATED COUNT: 17.2 %
GFR SERPLBLD BASED ON 1.73 SQ M-ARVRAT: 76 ML/MIN/1.73M2 (ref 60–?)
GLOBULIN PLAS-MCNC: 4.4 G/DL (ref 2.8–4.4)
GLUCOSE BLD-MCNC: 106 MG/DL (ref 70–99)
GLUCOSE UR STRIP.AUTO-MCNC: NEGATIVE MG/DL
HCT VFR BLD AUTO: 40.5 %
HGB BLD-MCNC: 12.8 G/DL
IMM GRANULOCYTES # BLD AUTO: 0.03 X10(3) UL (ref 0–1)
IMM GRANULOCYTES NFR BLD: 0.4 %
KETONES UR STRIP.AUTO-MCNC: NEGATIVE MG/DL
LEUKOCYTE ESTERASE UR QL STRIP.AUTO: NEGATIVE
LIPASE SERPL-CCNC: 125 U/L (ref 73–393)
LIPASE SERPL-CCNC: 31 U/L (ref 13–75)
LYMPHOCYTES # BLD AUTO: 2.17 X10(3) UL (ref 1–4)
LYMPHOCYTES NFR BLD AUTO: 28.9 %
MCH RBC QN AUTO: 24 PG (ref 26–34)
MCHC RBC AUTO-ENTMCNC: 31.6 G/DL (ref 31–37)
MCV RBC AUTO: 75.8 FL
MONOCYTES # BLD AUTO: 0.57 X10(3) UL (ref 0.1–1)
MONOCYTES NFR BLD AUTO: 7.6 %
NEUTROPHILS # BLD AUTO: 4.28 X10 (3) UL (ref 1.5–7.7)
NEUTROPHILS # BLD AUTO: 4.28 X10(3) UL (ref 1.5–7.7)
NEUTROPHILS NFR BLD AUTO: 56.8 %
NITRITE UR QL STRIP.AUTO: NEGATIVE
OSMOLALITY SERPL CALC.SUM OF ELEC: 279 MOSM/KG (ref 275–295)
PH UR STRIP.AUTO: 6 [PH] (ref 5–8)
PLATELET # BLD AUTO: 257 10(3)UL (ref 150–450)
POTASSIUM SERPL-SCNC: 4 MMOL/L (ref 3.5–5.1)
PROT SERPL-MCNC: 7.6 G/DL (ref 6.4–8.2)
PROT UR STRIP.AUTO-MCNC: NEGATIVE MG/DL
RBC # BLD AUTO: 5.34 X10(6)UL
RBC UR QL AUTO: NEGATIVE
SARS-COV-2 RNA RESP QL NAA+PROBE: NOT DETECTED
SODIUM SERPL-SCNC: 135 MMOL/L (ref 136–145)
SP GR UR STRIP.AUTO: <=1.005 (ref 1–1.03)
TROPONIN I HIGH SENSITIVITY: 4 NG/L
UROBILINOGEN UR STRIP.AUTO-MCNC: 0.2 MG/DL
WBC # BLD AUTO: 7.5 X10(3) UL (ref 4–11)

## 2023-03-08 PROCEDURE — 93005 ELECTROCARDIOGRAM TRACING: CPT

## 2023-03-08 PROCEDURE — 99285 EMERGENCY DEPT VISIT HI MDM: CPT

## 2023-03-08 PROCEDURE — 81003 URINALYSIS AUTO W/O SCOPE: CPT | Performed by: EMERGENCY MEDICINE

## 2023-03-08 PROCEDURE — 96361 HYDRATE IV INFUSION ADD-ON: CPT

## 2023-03-08 PROCEDURE — 80053 COMPREHEN METABOLIC PANEL: CPT | Performed by: EMERGENCY MEDICINE

## 2023-03-08 PROCEDURE — 93010 ELECTROCARDIOGRAM REPORT: CPT

## 2023-03-08 PROCEDURE — 96374 THER/PROPH/DIAG INJ IV PUSH: CPT

## 2023-03-08 PROCEDURE — 74177 CT ABD & PELVIS W/CONTRAST: CPT | Performed by: EMERGENCY MEDICINE

## 2023-03-08 PROCEDURE — 83690 ASSAY OF LIPASE: CPT | Performed by: EMERGENCY MEDICINE

## 2023-03-08 PROCEDURE — 85025 COMPLETE CBC W/AUTO DIFF WBC: CPT | Performed by: EMERGENCY MEDICINE

## 2023-03-08 PROCEDURE — 84484 ASSAY OF TROPONIN QUANT: CPT | Performed by: EMERGENCY MEDICINE

## 2023-03-08 RX ORDER — ONDANSETRON 2 MG/ML
4 INJECTION INTRAMUSCULAR; INTRAVENOUS
Status: DISCONTINUED | OUTPATIENT
Start: 2023-03-08 | End: 2023-03-08

## 2023-03-08 RX ORDER — ONDANSETRON 8 MG/1
8 TABLET, ORALLY DISINTEGRATING ORAL EVERY 6 HOURS PRN
Qty: 10 TABLET | Refills: 0 | Status: SHIPPED | OUTPATIENT
Start: 2023-03-08

## 2023-03-08 RX ORDER — SODIUM CHLORIDE 9 MG/ML
125 INJECTION, SOLUTION INTRAVENOUS CONTINUOUS
Status: DISCONTINUED | OUTPATIENT
Start: 2023-03-08 | End: 2023-03-08

## 2023-03-08 NOTE — ED INITIAL ASSESSMENT (HPI)
Pt to ed with c/o fatigue, weakness, shaking, bloated and lightheaded since last night.  Denies CP or SOB     PT was seen by PCP on 3/2/23 for same symptoms, PCP ordered blood work and colonoscopy

## 2023-03-08 NOTE — DISCHARGE INSTRUCTIONS
Avoid common stomach irritants like alcohol, cigarette smoke, acidic foods and beverages (especially tomato products, juices, and soda), caffeine, and NSAID medications like Motrin or Aleve  Continue your acid blocker omeprazole twice daily  Zofran for nausea    Contact your primary care doctor.   Your primary care doctor can speak with your GI specialist to see if your appointment can be expedited

## 2023-03-09 LAB
ATRIAL RATE: 74 BPM
P AXIS: 37 DEGREES
P-R INTERVAL: 142 MS
Q-T INTERVAL: 370 MS
QRS DURATION: 74 MS
QTC CALCULATION (BEZET): 410 MS
R AXIS: 20 DEGREES
T AXIS: 51 DEGREES
VENTRICULAR RATE: 74 BPM

## 2023-03-16 ENCOUNTER — OFFICE VISIT (OUTPATIENT)
Dept: FAMILY MEDICINE CLINIC | Facility: CLINIC | Age: 68
End: 2023-03-16
Payer: MEDICAID

## 2023-03-16 VITALS
TEMPERATURE: 97 F | SYSTOLIC BLOOD PRESSURE: 100 MMHG | RESPIRATION RATE: 16 BRPM | HEIGHT: 65 IN | WEIGHT: 150 LBS | DIASTOLIC BLOOD PRESSURE: 42 MMHG | HEART RATE: 78 BPM | OXYGEN SATURATION: 98 % | BODY MASS INDEX: 24.99 KG/M2

## 2023-03-16 DIAGNOSIS — E03.9 HYPOTHYROIDISM, UNSPECIFIED TYPE: ICD-10-CM

## 2023-03-16 DIAGNOSIS — E87.1 HYPONATREMIA: ICD-10-CM

## 2023-03-16 DIAGNOSIS — K21.9 GASTROESOPHAGEAL REFLUX DISEASE, UNSPECIFIED WHETHER ESOPHAGITIS PRESENT: Primary | ICD-10-CM

## 2023-03-16 PROCEDURE — 99214 OFFICE O/P EST MOD 30 MIN: CPT | Performed by: FAMILY MEDICINE

## 2023-03-16 PROCEDURE — 3078F DIAST BP <80 MM HG: CPT | Performed by: FAMILY MEDICINE

## 2023-03-16 PROCEDURE — 3074F SYST BP LT 130 MM HG: CPT | Performed by: FAMILY MEDICINE

## 2023-03-16 PROCEDURE — 3008F BODY MASS INDEX DOCD: CPT | Performed by: FAMILY MEDICINE

## 2023-03-17 LAB
ABSOLUTE BASOPHILS: 57 CELLS/UL (ref 0–200)
ABSOLUTE EOSINOPHILS: 466 CELLS/UL (ref 15–500)
ABSOLUTE LYMPHOCYTES: 2224 CELLS/UL (ref 850–3900)
ABSOLUTE MONOCYTES: 567 CELLS/UL (ref 200–950)
ABSOLUTE NEUTROPHILS: 2986 CELLS/UL (ref 1500–7800)
ALBUMIN/GLOBULIN RATIO: 1.3 (CALC) (ref 1–2.5)
ALBUMIN: 4.1 G/DL (ref 3.6–5.1)
ALKALINE PHOSPHATASE: 68 U/L (ref 37–153)
ALT: 9 U/L (ref 6–29)
AST: 17 U/L (ref 10–35)
BASOPHILS: 0.9 %
BILIRUBIN, TOTAL: 0.7 MG/DL (ref 0.2–1.2)
BUN/CREATININE RATIO: 9 (CALC) (ref 6–22)
BUN: 6 MG/DL (ref 7–25)
CALCIUM: 9.5 MG/DL (ref 8.6–10.4)
CARBON DIOXIDE: 31 MMOL/L (ref 20–32)
CHLORIDE: 94 MMOL/L (ref 98–110)
CHOL/HDLC RATIO: 2.9 (CALC)
CHOLESTEROL, TOTAL: 173 MG/DL
CREATININE: 0.66 MG/DL (ref 0.5–1.05)
EGFR: 95 ML/MIN/1.73M2
EOSINOPHILS: 7.4 %
GLOBULIN: 3.2 G/DL (CALC) (ref 1.9–3.7)
GLUCOSE: 79 MG/DL (ref 65–99)
HDL CHOLESTEROL: 59 MG/DL
HEMATOCRIT: 42.3 % (ref 35–45)
HEMOGLOBIN: 13.2 G/DL (ref 11.7–15.5)
LDL-CHOLESTEROL: 85 MG/DL (CALC)
LYMPHOCYTES: 35.3 %
MCH: 24 PG (ref 27–33)
MCHC: 31.2 G/DL (ref 32–36)
MCV: 76.8 FL (ref 80–100)
MONOCYTES: 9 %
MPV: 9.9 FL (ref 7.5–12.5)
NEUTROPHILS: 47.4 %
NON-HDL CHOLESTEROL: 114 MG/DL (CALC)
PLATELET COUNT: 278 THOUSAND/UL (ref 140–400)
POTASSIUM: 4.2 MMOL/L (ref 3.5–5.3)
PROTEIN, TOTAL: 7.3 G/DL (ref 6.1–8.1)
RDW: 15.5 % (ref 11–15)
RED BLOOD CELL COUNT: 5.51 MILLION/UL (ref 3.8–5.1)
SODIUM: 130 MMOL/L (ref 135–146)
TRIGLYCERIDES: 192 MG/DL
TSH W/REFLEX TO FT4: 2.02 MIU/L (ref 0.4–4.5)
VITAMIN D, 1,25 (OH)2,$TOTAL: 48 PG/ML (ref 18–72)
VITAMIN D2, 1,25 (OH)2: 19 PG/ML
VITAMIN D3, 1,25 (OH)2: 29 PG/ML
WHITE BLOOD CELL COUNT: 6.3 THOUSAND/UL (ref 3.8–10.8)

## 2023-04-06 RX ORDER — LEVOTHYROXINE SODIUM 0.1 MG/1
TABLET ORAL
Qty: 90 TABLET | Refills: 0 | OUTPATIENT
Start: 2023-04-06

## 2023-04-13 RX ORDER — LEVOTHYROXINE SODIUM 0.1 MG/1
TABLET ORAL
Qty: 90 TABLET | Refills: 0 | OUTPATIENT
Start: 2023-04-13

## 2023-04-24 RX ORDER — LEVOTHYROXINE SODIUM 0.1 MG/1
TABLET ORAL
Qty: 90 TABLET | Refills: 0 | Status: SHIPPED | OUTPATIENT
Start: 2023-04-24

## 2023-04-24 NOTE — TELEPHONE ENCOUNTER
Thyroid Supplements Protocol Passed 04/24/2023 05:26 PM   Protocol Details  TSH test in past 12 months    TSH value between 0.350 and 5.500 IU/ml    Appointment in past 12 or next 3 months     5/19/22 90 with 3

## 2023-05-17 DIAGNOSIS — K21.9 GASTROESOPHAGEAL REFLUX DISEASE, UNSPECIFIED WHETHER ESOPHAGITIS PRESENT: ICD-10-CM

## 2023-05-18 RX ORDER — OMEPRAZOLE 40 MG/1
CAPSULE, DELAYED RELEASE ORAL
Qty: 180 CAPSULE | Refills: 2 | Status: SHIPPED | OUTPATIENT
Start: 2023-05-18

## 2023-05-18 NOTE — TELEPHONE ENCOUNTER
LOV 3/16/2023    Future Appointments   Date Time Provider Robert Myers   6/12/2023  3:45 PM Joby Vidal MD [facility]   6/15/2023  3:45 PM Colletta Dural, MD [facility]     OMEPRAZOLE 40 MG Oral Capsule Delayed Release 60 capsule 3 11/17/2022

## 2023-06-12 ENCOUNTER — OFFICE VISIT (OUTPATIENT)
Facility: LOCATION | Age: 68
End: 2023-06-12
Payer: MEDICAID

## 2023-06-12 VITALS — TEMPERATURE: 98 F | HEART RATE: 77 BPM

## 2023-06-12 DIAGNOSIS — K21.9 GASTROESOPHAGEAL REFLUX DISEASE, UNSPECIFIED WHETHER ESOPHAGITIS PRESENT: Primary | ICD-10-CM

## 2023-06-12 DIAGNOSIS — Z12.11 ENCOUNTER FOR SCREENING COLONOSCOPY: ICD-10-CM

## 2023-06-12 PROCEDURE — 99213 OFFICE O/P EST LOW 20 MIN: CPT | Performed by: SURGERY

## 2023-06-12 RX ORDER — POLYETHYLENE GLYCOL 3350, SODIUM CHLORIDE, SODIUM BICARBONATE, POTASSIUM CHLORIDE 420; 11.2; 5.72; 1.48 G/4L; G/4L; G/4L; G/4L
POWDER, FOR SOLUTION ORAL
Qty: 1 EACH | Refills: 0 | Status: SHIPPED | OUTPATIENT
Start: 2023-06-12

## 2023-06-15 ENCOUNTER — OFFICE VISIT (OUTPATIENT)
Dept: FAMILY MEDICINE CLINIC | Facility: CLINIC | Age: 68
End: 2023-06-15
Payer: MEDICAID

## 2023-06-15 VITALS
RESPIRATION RATE: 18 BRPM | DIASTOLIC BLOOD PRESSURE: 50 MMHG | HEIGHT: 65 IN | BODY MASS INDEX: 24.83 KG/M2 | HEART RATE: 76 BPM | TEMPERATURE: 98 F | WEIGHT: 149 LBS | OXYGEN SATURATION: 97 % | SYSTOLIC BLOOD PRESSURE: 98 MMHG

## 2023-06-15 DIAGNOSIS — K21.9 GASTROESOPHAGEAL REFLUX DISEASE, UNSPECIFIED WHETHER ESOPHAGITIS PRESENT: ICD-10-CM

## 2023-06-15 PROCEDURE — 3008F BODY MASS INDEX DOCD: CPT | Performed by: FAMILY MEDICINE

## 2023-06-15 PROCEDURE — 3074F SYST BP LT 130 MM HG: CPT | Performed by: FAMILY MEDICINE

## 2023-06-15 PROCEDURE — 3078F DIAST BP <80 MM HG: CPT | Performed by: FAMILY MEDICINE

## 2023-06-15 PROCEDURE — 99214 OFFICE O/P EST MOD 30 MIN: CPT | Performed by: FAMILY MEDICINE

## 2023-06-15 RX ORDER — LEVOTHYROXINE SODIUM 0.1 MG/1
100 TABLET ORAL DAILY
Qty: 90 TABLET | Refills: 0 | Status: SHIPPED | OUTPATIENT
Start: 2023-06-15

## 2023-06-15 RX ORDER — OMEPRAZOLE 40 MG/1
40 CAPSULE, DELAYED RELEASE ORAL
Qty: 180 CAPSULE | Refills: 2 | Status: SHIPPED | OUTPATIENT
Start: 2023-06-15

## 2023-08-03 ENCOUNTER — TELEPHONE (OUTPATIENT)
Dept: FAMILY MEDICINE CLINIC | Facility: CLINIC | Age: 68
End: 2023-08-03

## 2023-08-03 NOTE — TELEPHONE ENCOUNTER
Due to COVID-19 ACTION PLAN, the patient's office visit was converted to a phone visit.    This patient verbally consents to a telephone visit.    Patient states they are Gat[place] Chinchilla and that they are speaking to me from their home.     It has been   months since the patient's last in-office visit.    Patient notes the following changes in medical history since last visit:   Chief Complaint   Patient presents with   • Follow-up     pt fell and is in hospital     Patient fell Wednesday morning .  He fell  In the bedroom . Wife called the paramedics . She took him to [place]  .  He is still in the hospital.   He had some blood in the brain .  He had  A  ct  and a Mri .  He  needed to be restrained  .  He was given med   He has dementia.  .  He had therapy  Today  .  He eats good      Review of Systems   Eyes:        Macular hole     Neurological:        Dementia and depression          Patient offers the following concerns: he fell and in the hospital      Patient is doing home BP checks: No    The following testing was reviewed during this phone visit: no review     Medication and allergy reconciliation completed over the phone.     The following problems were addressed during today's call:  Problem List Items Addressed This Visit        Nervous    Alzheimer's disease (CMS/HCC)     On aricept and  namenda              Endocrine    Dyslipidemia - Primary     Low chol diet              Other    Fall     He is in the hospital  Due to a fall                 The following was ordered during today's call:   No orders       Time spent talking with patient during today's call: 21-30 minutes      Testing should be completed prior to next visit. New prescriptions / refills sent to the pharmacy.    Patient was advised to call if they experience any new or worsening symptoms.    Return in about 3 months (around 9/15/2020).   Lvm for pt to call and reschedule appt since he was a no show. There is a $40 no show charge.

## 2023-08-09 ENCOUNTER — OFFICE VISIT (OUTPATIENT)
Dept: FAMILY MEDICINE CLINIC | Facility: CLINIC | Age: 68
End: 2023-08-09
Payer: MEDICAID

## 2023-08-09 DIAGNOSIS — E03.9 HYPOTHYROIDISM, UNSPECIFIED TYPE: ICD-10-CM

## 2023-08-09 DIAGNOSIS — R71.8 MICROCYTOSIS: ICD-10-CM

## 2023-08-09 DIAGNOSIS — R53.83 FATIGUE, UNSPECIFIED TYPE: Primary | ICD-10-CM

## 2023-08-09 PROCEDURE — 99213 OFFICE O/P EST LOW 20 MIN: CPT | Performed by: FAMILY MEDICINE

## 2023-08-09 NOTE — PROGRESS NOTES
LMP  (LMP Unknown)               No chief complaint on file. HPI;    Radha Nj is a 70year old female. Who is here today for follow-up  Patient has history of abdominal discomfort, chronic iron deficiency anemia  Patient was sent to general surgery for EGD and colonoscopy for work-up for her anemia and fatigue  Unfortunately her procedures are delayed and is now scheduled in November  Patient has been taking over-the-counter iron tablets  She does complain of on and off nausea and takes as needed    She also has hypothyroidism and is currently taking levothyroxine  Her last TSH was done 6 months ago and that was normal  Patient continues to have fatigue, gastroesophageal reflux and heartburn despite taking omeprazole twice a day  Wt Readings from Last 3 Encounters:  06/15/23 : 149 lb (67.6 kg)  03/16/23 : 150 lb (68 kg)  03/08/23 : 160 lb (72.6 kg)  BP Readings from Last 3 Encounters:  06/15/23 : 98/50  03/16/23 : 100/42  03/08/23 : 112/56        ALLERGIES:  No Known Allergies  Current Outpatient Medications   Medication Sig Dispense Refill    levothyroxine 100 MCG Oral Tab Take 1 tablet (100 mcg total) by mouth daily. 90 tablet 0    Omeprazole 40 MG Oral Capsule Delayed Release Take 1 capsule (40 mg total) by mouth 2 (two) times daily before meals. 180 capsule 2    PEG 3350-KCl-Na Bicarb-NaCl (TRILYTE) 420 g Oral Recon Soln Starting at 4:00 pm the night before procedure, drink 8 ounces of the prep every 15-20 minutes until finished (Patient not taking: Reported on 6/15/2023) 1 each 0    ondansetron 8 MG Oral Tablet Dispersible Take 1 tablet (8 mg total) by mouth every 6 (six) hours as needed for Nausea.  10 tablet 0      Past Medical History:   Diagnosis Date    Esophageal reflux     Hypothyroidism       Social History:  Social History     Socioeconomic History    Marital status:    Tobacco Use    Smoking status: Never    Smokeless tobacco: Never   Vaping Use    Vaping Use: Never used   Substance and Sexual Activity    Alcohol use: Never        REVIEW OF SYSTEMS:   GENERAL HEALTH: feels well otherwise  SKIN: denies any unusual skin lesions or rashes  RESPIRATORY: denies shortness of breath with exertion  CARDIOVASCULAR: denies chest pain on exertion  GI: denies abdominal pain and denies heartburn  NEURO: denies headaches  EXAM:   LMP  (LMP Unknown)   GENERAL: well developed, well nourished,in no apparent distress  SKIN: no rashes,no suspicious lesions  HEENT: atraumatic, normocephalic,ears and throat are clear  NECK: supple,no adenopathy,no bruits  LUNGS: clear to auscultation  CARDIO: RRR without murmur  GI: good BS's,no masses, HSM or tenderness  EXTREMITIES: no cyanosis, clubbing or edema    ASSESSMENT AND PLAN:   I had a long discussion with the patient regarding her symptoms that could be due to hypothyroidism or do anemia  Plan is to continue omeprazole twice a day and levothyroxine daily  We will check her labs  Fatigue, unspecified type  -     COMP METABOLIC PANEL (14)    Microcytosis  -     CBC WITH DIFFERENTIAL WITH PLATELET  -     IRON AND TIBC  -     FERRITIN    Hypothyroidism, unspecified type  -     TSH W REFLEX TO FREE T4        The patient indicates understanding of these issues and agrees to the plan. Imaging & Consults:  None  Meds & Refills for this Visit:  Requested Prescriptions      No prescriptions requested or ordered in this encounter     Orders Placed This Encounter      CBC With Differential With Platelet      Comp Metabolic Panel (14)      Iron And Tibc      Ferritin      TSH W REFLEX TO FREE T4 [[id number]][Q]            Mukul Kennedy MD   [address], [facility] [address] [facility] [address]    Electronically signed    This dictation was performed with a verbal recognition program (DRAGON) and it was checked for errors. It is possible that there are still dictated errors within this office note. If so, please bring any errors to my attention for an addendum.  All efforts were made to ensure that this office note is accurate

## 2023-08-19 LAB
% SATURATION: 15 % (CALC) (ref 16–45)
ABSOLUTE BASOPHILS: 41 CELLS/UL (ref 0–200)
ABSOLUTE EOSINOPHILS: 429 CELLS/UL (ref 15–500)
ABSOLUTE LYMPHOCYTES: 2256 CELLS/UL (ref 850–3900)
ABSOLUTE MONOCYTES: 487 CELLS/UL (ref 200–950)
ABSOLUTE NEUTROPHILS: 2587 CELLS/UL (ref 1500–7800)
ALBUMIN/GLOBULIN RATIO: 1.2 (CALC) (ref 1–2.5)
ALBUMIN: 4.1 G/DL (ref 3.6–5.1)
ALKALINE PHOSPHATASE: 74 U/L (ref 37–153)
ALT: 11 U/L (ref 6–29)
AST: 16 U/L (ref 10–35)
BASOPHILS: 0.7 %
BILIRUBIN, TOTAL: 0.8 MG/DL (ref 0.2–1.2)
BUN: 12 MG/DL (ref 7–25)
CALCIUM: 9.6 MG/DL (ref 8.6–10.4)
CARBON DIOXIDE: 31 MMOL/L (ref 20–32)
CHLORIDE: 97 MMOL/L (ref 98–110)
CREATININE: 0.76 MG/DL (ref 0.5–1.05)
EGFR: 85 ML/MIN/1.73M2
EOSINOPHILS: 7.4 %
FERRITIN: 25 NG/ML (ref 16–288)
GLOBULIN: 3.5 G/DL (CALC) (ref 1.9–3.7)
GLUCOSE: 83 MG/DL (ref 65–99)
HEMATOCRIT: 43 % (ref 35–45)
HEMOGLOBIN: 13.3 G/DL (ref 11.7–15.5)
IRON BINDING CAPACITY: 470 MCG/DL (CALC) (ref 250–450)
IRON, TOTAL: 71 MCG/DL (ref 45–160)
LYMPHOCYTES: 38.9 %
MCH: 24.1 PG (ref 27–33)
MCHC: 30.9 G/DL (ref 32–36)
MCV: 77.9 FL (ref 80–100)
MONOCYTES: 8.4 %
MPV: 9.8 FL (ref 7.5–12.5)
NEUTROPHILS: 44.6 %
PLATELET COUNT: 262 THOUSAND/UL (ref 140–400)
POTASSIUM: 4.2 MMOL/L (ref 3.5–5.3)
PROTEIN, TOTAL: 7.6 G/DL (ref 6.1–8.1)
RDW: 14.8 % (ref 11–15)
RED BLOOD CELL COUNT: 5.52 MILLION/UL (ref 3.8–5.1)
SODIUM: 135 MMOL/L (ref 135–146)
TSH W/REFLEX TO FT4: 1.94 MIU/L (ref 0.4–4.5)
WHITE BLOOD CELL COUNT: 5.8 THOUSAND/UL (ref 3.8–10.8)

## 2023-08-22 ENCOUNTER — TELEPHONE (OUTPATIENT)
Dept: FAMILY MEDICINE CLINIC | Facility: CLINIC | Age: 68
End: 2023-08-22

## 2023-08-22 NOTE — TELEPHONE ENCOUNTER
Patient is calling to get results from most recent lab work done.  Patient would like a call back at [phone]

## 2023-09-11 ENCOUNTER — OFFICE VISIT (OUTPATIENT)
Dept: FAMILY MEDICINE CLINIC | Facility: CLINIC | Age: 68
End: 2023-09-11
Payer: MEDICAID

## 2023-09-11 VITALS
TEMPERATURE: 97 F | HEIGHT: 65 IN | BODY MASS INDEX: 24.99 KG/M2 | HEART RATE: 73 BPM | OXYGEN SATURATION: 97 % | DIASTOLIC BLOOD PRESSURE: 58 MMHG | WEIGHT: 150 LBS | SYSTOLIC BLOOD PRESSURE: 108 MMHG | RESPIRATION RATE: 16 BRPM

## 2023-09-11 DIAGNOSIS — R71.8 MICROCYTOSIS: Primary | ICD-10-CM

## 2023-09-11 DIAGNOSIS — E03.9 HYPOTHYROIDISM, UNSPECIFIED TYPE: ICD-10-CM

## 2023-09-11 PROCEDURE — 99214 OFFICE O/P EST MOD 30 MIN: CPT | Performed by: FAMILY MEDICINE

## 2023-09-11 PROCEDURE — 3008F BODY MASS INDEX DOCD: CPT | Performed by: FAMILY MEDICINE

## 2023-09-11 PROCEDURE — 3074F SYST BP LT 130 MM HG: CPT | Performed by: FAMILY MEDICINE

## 2023-09-11 PROCEDURE — 3078F DIAST BP <80 MM HG: CPT | Performed by: FAMILY MEDICINE

## 2023-09-11 RX ORDER — LEVOTHYROXINE SODIUM 0.1 MG/1
100 TABLET ORAL DAILY
Qty: 90 TABLET | Refills: 0 | Status: SHIPPED | OUTPATIENT
Start: 2023-09-11

## 2023-09-19 ENCOUNTER — TELEPHONE (OUTPATIENT)
Dept: FAMILY MEDICINE CLINIC | Facility: CLINIC | Age: 68
End: 2023-09-19

## 2023-09-19 NOTE — TELEPHONE ENCOUNTER
Returned call to patient's son who states patient has been taking the iron pills for about two weeks. She stopped taking them because she developed burning abdominal pain across her whole abdomen radiating to her back. States she was taking the iron with food. Asked what type of iron she was taking from the label on the bottle. Advised he can send a photo of the label in a [employer] message. Dr. Janes De Oliveira can review and possibly make a better recommendation. States he will send an email to Dr. Janes De Oliveira with the label information. Dr. Janes De Oliveira,  please advise      Abnormal CBC with MCV of 77  Low iron levels with high TIBC  Is she taking the iron pills?   TSH is normal, continue the current dosage of levothyroxine   Written by Demarcus Marcus MD on 8/19/2023  6:02 AM CDT

## 2023-09-19 NOTE — TELEPHONE ENCOUNTER
Spoke to pt's son, Obdulia Mosquera who's requesting to speak to a nurse. The pt is experiencing burning sensation in stomach from taking iron supplements.

## 2023-09-20 NOTE — TELEPHONE ENCOUNTER
[phone]   Called and spoke with pt's son, denise ([id number] Kori Shah per HIPAA), to inform f/u on pt and iron supplement recommendation- son indicated will need to call us back when able to talk.

## 2023-09-20 NOTE — TELEPHONE ENCOUNTER
At the last appointment patient showed me the brands of iron she has at home  I had advised her to take it twice a day  If taking it twice a day is causing the problem which is possible as iron can cause constipation,   she should  take it at least once a day and see if the problem has resolved  If she continues to have abdominal pain and constipation with iron supplement, I can send a prescription of generic iron sulfate

## 2023-09-22 ENCOUNTER — MOBILE ENCOUNTER (OUTPATIENT)
Dept: FAMILY MEDICINE CLINIC | Facility: CLINIC | Age: 68
End: 2023-09-22

## 2023-09-22 NOTE — TELEPHONE ENCOUNTER
Spoke with son, he said she is only taking once a day and still having issue. They would like a script sent to pharmacy on file.

## 2023-10-17 RX ORDER — GARLIC EXTRACT 500 MG
1 CAPSULE ORAL DAILY
COMMUNITY

## 2023-11-01 ENCOUNTER — OFFICE VISIT (OUTPATIENT)
Dept: SURGERY | Facility: CLINIC | Age: 68
End: 2023-11-01
Payer: MEDICAID

## 2023-11-01 VITALS — HEART RATE: 66 BPM | TEMPERATURE: 97 F

## 2023-11-01 DIAGNOSIS — Z12.11 SCREEN FOR COLON CANCER: ICD-10-CM

## 2023-11-01 DIAGNOSIS — K21.9 GASTROESOPHAGEAL REFLUX DISEASE, UNSPECIFIED WHETHER ESOPHAGITIS PRESENT: Primary | ICD-10-CM

## 2023-11-01 PROCEDURE — 99212 OFFICE O/P EST SF 10 MIN: CPT | Performed by: SURGERY

## 2023-11-01 RX ORDER — POLYETHYLENE GLYCOL 3350, SODIUM CHLORIDE, SODIUM BICARBONATE, POTASSIUM CHLORIDE 420; 11.2; 5.72; 1.48 G/4L; G/4L; G/4L; G/4L
POWDER, FOR SOLUTION ORAL
Qty: 1 EACH | Refills: 0 | Status: SHIPPED | OUTPATIENT
Start: 2023-11-01

## 2023-11-08 ENCOUNTER — ANESTHESIA EVENT (OUTPATIENT)
Dept: ENDOSCOPY | Facility: HOSPITAL | Age: 68
End: 2023-11-08
Payer: MEDICAID

## 2023-11-09 ENCOUNTER — ANESTHESIA (OUTPATIENT)
Dept: ENDOSCOPY | Facility: HOSPITAL | Age: 68
End: 2023-11-09
Payer: MEDICAID

## 2023-11-09 ENCOUNTER — HOSPITAL ENCOUNTER (OUTPATIENT)
Facility: HOSPITAL | Age: 68
Setting detail: HOSPITAL OUTPATIENT SURGERY
Discharge: HOME | End: 2023-11-09
Attending: SURGERY | Admitting: SURGERY
Payer: MEDICAID

## 2023-11-09 VITALS
SYSTOLIC BLOOD PRESSURE: 117 MMHG | WEIGHT: 150 LBS | DIASTOLIC BLOOD PRESSURE: 62 MMHG | TEMPERATURE: 97 F | RESPIRATION RATE: 16 BRPM | OXYGEN SATURATION: 100 % | BODY MASS INDEX: 24.99 KG/M2 | HEIGHT: 65 IN | HEART RATE: 62 BPM

## 2023-11-09 DIAGNOSIS — Z12.11 ENCOUNTER FOR SCREENING COLONOSCOPY: ICD-10-CM

## 2023-11-09 PROCEDURE — 88305 TISSUE EXAM BY PATHOLOGIST: CPT | Performed by: SURGERY

## 2023-11-09 PROCEDURE — 0DBL8ZX EXCISION OF TRANSVERSE COLON, VIA NATURAL OR ARTIFICIAL OPENING ENDOSCOPIC, DIAGNOSTIC: ICD-10-PCS | Performed by: SURGERY

## 2023-11-09 PROCEDURE — 0DB68ZX EXCISION OF STOMACH, VIA NATURAL OR ARTIFICIAL OPENING ENDOSCOPIC, DIAGNOSTIC: ICD-10-PCS | Performed by: SURGERY

## 2023-11-09 RX ORDER — SODIUM CHLORIDE, SODIUM LACTATE, POTASSIUM CHLORIDE, CALCIUM CHLORIDE 600; 310; 30; 20 MG/100ML; MG/100ML; MG/100ML; MG/100ML
INJECTION, SOLUTION INTRAVENOUS CONTINUOUS
Status: DISCONTINUED | OUTPATIENT
Start: 2023-11-09 | End: 2023-11-09

## 2023-11-09 RX ORDER — LIDOCAINE HYDROCHLORIDE 10 MG/ML
INJECTION, SOLUTION EPIDURAL; INFILTRATION; INTRACAUDAL; PERINEURAL AS NEEDED
Status: DISCONTINUED | OUTPATIENT
Start: 2023-11-09 | End: 2023-11-09 | Stop reason: SURG

## 2023-11-09 RX ORDER — NALOXONE HYDROCHLORIDE 0.4 MG/ML
0.08 INJECTION, SOLUTION INTRAMUSCULAR; INTRAVENOUS; SUBCUTANEOUS ONCE AS NEEDED
Status: DISCONTINUED | OUTPATIENT
Start: 2023-11-09 | End: 2023-11-09

## 2023-11-09 RX ORDER — ONDANSETRON 2 MG/ML
4 INJECTION INTRAMUSCULAR; INTRAVENOUS ONCE AS NEEDED
Status: DISCONTINUED | OUTPATIENT
Start: 2023-11-09 | End: 2023-11-09

## 2023-11-09 RX ADMIN — SODIUM CHLORIDE, SODIUM LACTATE, POTASSIUM CHLORIDE, CALCIUM CHLORIDE: 600; 310; 30; 20 INJECTION, SOLUTION INTRAVENOUS at 07:07:00

## 2023-11-09 RX ADMIN — LIDOCAINE HYDROCHLORIDE 50 MG: 10 INJECTION, SOLUTION EPIDURAL; INFILTRATION; INTRACAUDAL; PERINEURAL at 07:10:00

## 2023-11-09 NOTE — ANESTHESIA POSTPROCEDURE EVALUATION
[facility] Patient Status:  Hospital Outpatient Surgery   Age/Gender 70year old female MRN [id number]   Location [address] [address] Attending Tiffanie Sequeira MD   [facility] Day # 0 PCP Gillian Pantoja MD       Anesthesia Post-op Note    ESOPHAGOGASTRODUODENOSCOPY (EGD) WITH BIOPSY, COLONOSCOPY with hot snare polypectomy    Procedure Summary       Date: 11/09/23 Room / Location: [facility] ENDOSCOPY 04 / 140[address] [facility] ENDOSCOPY    Anesthesia Start: 2096 Anesthesia Stop: 3839    Procedures:       ESOPHAGOGASTRODUODENOSCOPY (EGD) WITH BIOPSY, COLONOSCOPY with hot snare polypectomy      COLONOSCOPY Diagnosis:       Encounter for screening colonoscopy      (EGD: Hiatal hernia, Colon: Polyp)    Surgeons: Tiffanie Sequeira MD Anesthesiologist: Martha Jordan MD    Anesthesia Type: MAC ASA Status: 2            Anesthesia Type: MAC    Vitals Value Taken Time   BP 98/62 11/09/23 0738   Temp  11/09/23 0739   Pulse 71 11/09/23 0738   Resp 18 11/09/23 0738   SpO2 100 % 11/09/23 0738   Vitals shown include unfiled device data. Patient Location: Endoscopy    Anesthesia Type: MAC    Airway Patency: extubated and patent    Postop Pain Control: adequate    Mental Status: preanesthetic baseline    Nausea/Vomiting: none    Cardiopulmonary/Hydration status: stable euvolemic    Complications: no apparent anesthesia related complications    Postop vital signs: stable    Dental Exam: Unchanged from Preop    Patient to be discharged from PACU when criteria met.

## 2023-11-09 NOTE — INTERVAL H&P NOTE
Pre-op Diagnosis: Encounter for screening colonoscopy [Z12.11]    The above referenced H&P was reviewed by Jacky Salazar MD on 11/9/2023, the patient was examined and no significant changes have occurred in the patient's condition since the H&P was performed. I discussed with the patient and/or legal representative the potential benefits, risks and side effects of this procedure; the likelihood of the patient achieving goals; and potential problems that might occur during recuperation. I discussed reasonable alternatives to the procedure, including risks, benefits and side effects related to the alternatives and risks related to not receiving this procedure. We will proceed with procedure as planned.

## 2023-11-14 ENCOUNTER — PATIENT OUTREACH (OUTPATIENT)
Facility: LOCATION | Age: 68
End: 2023-11-14

## 2023-11-27 ENCOUNTER — OFFICE VISIT (OUTPATIENT)
Dept: FAMILY MEDICINE CLINIC | Facility: CLINIC | Age: 68
End: 2023-11-27
Payer: MEDICAID

## 2023-11-27 VITALS
SYSTOLIC BLOOD PRESSURE: 124 MMHG | TEMPERATURE: 97 F | HEART RATE: 81 BPM | DIASTOLIC BLOOD PRESSURE: 64 MMHG | OXYGEN SATURATION: 98 % | WEIGHT: 152 LBS | HEIGHT: 65 IN | RESPIRATION RATE: 18 BRPM | BODY MASS INDEX: 25.33 KG/M2

## 2023-11-27 DIAGNOSIS — E61.1 IRON DEFICIENCY: Primary | ICD-10-CM

## 2023-11-27 DIAGNOSIS — R71.8 MICROCYTOSIS: ICD-10-CM

## 2023-11-27 DIAGNOSIS — E03.9 HYPOTHYROIDISM, UNSPECIFIED TYPE: ICD-10-CM

## 2023-11-27 RX ORDER — FERROUS SULFATE 7.5 MG/0.5
225 SYRINGE (EA) ORAL 2 TIMES DAILY
Qty: 150 ML | Refills: 0 | Status: SHIPPED | OUTPATIENT
Start: 2023-11-27

## 2023-11-27 RX ORDER — LEVOTHYROXINE SODIUM 0.1 MG/1
100 TABLET ORAL DAILY
Qty: 90 TABLET | Refills: 0 | Status: SHIPPED | OUTPATIENT
Start: 2023-11-27

## 2024-01-02 RX ORDER — LEVOTHYROXINE SODIUM 0.1 MG/1
100 TABLET ORAL DAILY
Qty: 90 TABLET | Refills: 0 | Status: SHIPPED | OUTPATIENT
Start: 2024-01-02

## 2024-01-02 NOTE — TELEPHONE ENCOUNTER
Thyroid Supplements Protocol [id number]/30/2023 05:35 PM   Protocol Details TSH test in past 12 months    TSH value between 0.350 and 5.500 IU/ml    Appointment in past 12 or next 3 months        LOV 11/27/23 dr odell    LAST LAB 8/19/23    LAST RX 11/27/23 90     Next OV No future appointments.      PROTOCOL pass

## 2024-01-07 LAB
% SATURATION: 17 % (CALC) (ref 16–45)
ABSOLUTE BASOPHILS: 49 CELLS/UL (ref 0–200)
ABSOLUTE EOSINOPHILS: 364 CELLS/UL (ref 15–500)
ABSOLUTE LYMPHOCYTES: 2338 CELLS/UL (ref 850–3900)
ABSOLUTE MONOCYTES: 602 CELLS/UL (ref 200–950)
ABSOLUTE NEUTROPHILS: 3647 CELLS/UL (ref 1500–7800)
BASOPHILS: 0.7 %
EOSINOPHILS: 5.2 %
HEMATOCRIT: 43.3 % (ref 35–45)
HEMOGLOBIN: 13.4 G/DL (ref 11.7–15.5)
IRON BINDING CAPACITY: 491 MCG/DL (CALC) (ref 250–450)
IRON, TOTAL: 83 MCG/DL (ref 45–160)
LYMPHOCYTES: 33.4 %
MCH: 24.3 PG (ref 27–33)
MCHC: 30.9 G/DL (ref 32–36)
MCV: 78.4 FL (ref 80–100)
MONOCYTES: 8.6 %
MPV: 9.5 FL (ref 7.5–12.5)
NEUTROPHILS: 52.1 %
PLATELET COUNT: 307 THOUSAND/UL (ref 140–400)
RDW: 15 % (ref 11–15)
RED BLOOD CELL COUNT: 5.52 MILLION/UL (ref 3.8–5.1)
T4, FREE: 1.2 NG/DL (ref 0.8–1.8)
TSH: 4.8 MIU/L (ref 0.4–4.5)
WHITE BLOOD CELL COUNT: 7 THOUSAND/UL (ref 3.8–10.8)

## 2024-01-09 RX ORDER — LEVOTHYROXINE SODIUM 112 UG/1
112 TABLET ORAL DAILY
Qty: 90 TABLET | Refills: 1 | Status: SHIPPED | OUTPATIENT
Start: 2024-01-09

## 2024-01-11 ENCOUNTER — TELEPHONE (OUTPATIENT)
Dept: FAMILY MEDICINE CLINIC | Facility: CLINIC | Age: 69
End: 2024-01-11

## 2024-01-11 NOTE — TELEPHONE ENCOUNTER
Pt calling regarding lab results.     Also looking to get an appointment was seen in ED, has covid. Please advise

## 2024-01-11 NOTE — TELEPHONE ENCOUNTER
Your TSH is slightly elevated, levothyroxine needs to be increased.  An updated prescription of [id number] mcg sent to the pharmacy on file.  Your iron levels are normal but your TIBC is still high indicating that you need to continue getting iron tablets   Written by Tim Cee MD on 1/9/2024 12:18 PM CST    [phone]   Lm for pt (Ok per HIPAA) to inform f/u on lab results. To call back at the office to further discuss.     Front- may schedule video visit for ER f/u- fever, cough, covid +. Availability Monday (1/15). Thank you.

## 2024-01-16 ENCOUNTER — MED REC SCAN ONLY (OUTPATIENT)
Dept: FAMILY MEDICINE CLINIC | Facility: CLINIC | Age: 69
End: 2024-01-16

## 2024-01-22 ENCOUNTER — OFFICE VISIT (OUTPATIENT)
Dept: FAMILY MEDICINE CLINIC | Facility: CLINIC | Age: 69
End: 2024-01-22
Payer: MEDICAID

## 2024-01-22 VITALS
OXYGEN SATURATION: 98 % | RESPIRATION RATE: 16 BRPM | SYSTOLIC BLOOD PRESSURE: 120 MMHG | BODY MASS INDEX: 26.45 KG/M2 | HEART RATE: 80 BPM | TEMPERATURE: 98 F | HEIGHT: 63.19 IN | DIASTOLIC BLOOD PRESSURE: 74 MMHG | WEIGHT: 151.13 LBS

## 2024-01-22 DIAGNOSIS — S39.92XA INJURY OF COCCYX, INITIAL ENCOUNTER: ICD-10-CM

## 2024-01-22 DIAGNOSIS — E03.9 HYPOTHYROIDISM, UNSPECIFIED TYPE: ICD-10-CM

## 2024-01-22 DIAGNOSIS — E87.1 HYPONATREMIA: ICD-10-CM

## 2024-01-22 DIAGNOSIS — E61.1 IRON DEFICIENCY: Primary | ICD-10-CM

## 2024-01-22 PROCEDURE — 3008F BODY MASS INDEX DOCD: CPT | Performed by: FAMILY MEDICINE

## 2024-01-22 PROCEDURE — 99214 OFFICE O/P EST MOD 30 MIN: CPT | Performed by: FAMILY MEDICINE

## 2024-01-22 PROCEDURE — 3078F DIAST BP <80 MM HG: CPT | Performed by: FAMILY MEDICINE

## 2024-01-22 PROCEDURE — 3074F SYST BP LT 130 MM HG: CPT | Performed by: FAMILY MEDICINE

## 2024-01-22 RX ORDER — BENZONATATE 200 MG/1
CAPSULE ORAL
COMMUNITY
Start: 2024-01-11

## 2024-01-22 NOTE — PROGRESS NOTES
/74   Pulse 80   Temp 97.5 °F (36.4 °C) (Temporal)   Resp 16   Ht 5' 3.19\" (1.605 m)   Wt 151 lb 2 oz (68.5 kg)   LMP  (LMP Unknown)   SpO2 98%   BMI 26.61 kg/m²               Chief Complaint   Patient presents with    Lab Results        HPI;    Jojo Yin is a 69 year old female.  Here for follow-up  History of hypothyroidism iron deficiency anemia microcytosis, gastroesophageal reflux  Patient is on multiple medications   she is recovering from recent COVID  Has finished the course of Paxlovid   denies any cough chest pain or shortness of breath but feels weak overall    She is here to discuss the result of the blood test  Her recent blood work revealed slightly elevated TSH, she was taking levothyroxine 100 mcg  I had sent a message to increase to 112 and medication was sent to the pharmacy  Unfortunately patient is still taking 100 mcg of levothyroxine    Her CBC showed microcytosis with a MCV of 78  Her iron profile was normal elevated TIBC    Patient has been taking multiple over-the-counter medications that includes digestive Gummies as well as magnesium, probiotics and multivitamins    Patient is also complaining of pain in her tailbone after she slipped and almost fell about a week ago  She cannot take any ibuprofen, and Tylenol does not help her      Wt Readings from Last 3 Encounters:   01/22/24 151 lb 2 oz (68.5 kg)   11/27/23 152 lb (68.9 kg)   11/09/23 150 lb (68 kg)     BP Readings from Last 3 Encounters:   01/22/24 120/74   11/27/23 124/64   11/09/23 117/62         ALLERGIES:  No Known Allergies  Current Outpatient Medications   Medication Sig Dispense Refill    diclofenac 1 % External Gel Apply 2 g topically 4 (four) times daily. 100 g 2    levothyroxine 112 MCG Oral Tab Take 1 tablet (112 mcg total) by mouth daily. 90 tablet 1    Omeprazole 40 MG Oral Capsule Delayed Release Take 1 capsule (40 mg total) by mouth 2 (two) times daily before meals. 180 capsule 2    benzonatate 200 MG  Oral Cap TAKE 1 CAPSULE BY MOUTH THREE TIMES DAILY AS NEEDED FOR COUGH FOR UP TO 7 DAYS. DO NOT CRUSH OR CHEW. (Patient not taking: Reported on 1/22/2024)      nirmatrelvir-ritonavir 300-100 MG Oral Tablet Therapy Pack Take 2 pink tablets of nirmatrelvir with 1 white tablet of ritonavir by mouth 2 times each day (in the morning and in the evening) for 5 days. For each dose, take all 3 tablets at the same time. (Patient not taking: Reported on 1/22/2024)      ferrous sulfate 75 (15 Fe) mg/mL Oral Solution Take 15 mL (225 mg total) by mouth 2 (two) times daily. 150 mL 0    acidophilus-pectin Oral Cap Take 1 capsule by mouth daily. (Patient not taking: Reported on 1/22/2024)        Past Medical History:   Diagnosis Date    Disorder of thyroid     Esophageal reflux     Hypothyroidism     Visual impairment     glasses      Social History:  Social History     Socioeconomic History    Marital status:    Tobacco Use    Smoking status: Never    Smokeless tobacco: Never   Vaping Use    Vaping Use: Never used   Substance and Sexual Activity    Alcohol use: Never    Drug use: Never        REVIEW OF SYSTEMS:   GENERAL HEALTH: feels well otherwise  SKIN: denies any unusual skin lesions or rashes  RESPIRATORY: denies shortness of breath with exertion  CARDIOVASCULAR: denies chest pain on exertion  GI: denies abdominal pain and denies heartburn  NEURO: denies headaches  EXAM:   /74   Pulse 80   Temp 97.5 °F (36.4 °C) (Temporal)   Resp 16   Ht 5' 3.19\" (1.605 m)   Wt 151 lb 2 oz (68.5 kg)   LMP  (LMP Unknown)   SpO2 98%   BMI 26.61 kg/m²   GENERAL: well developed, well nourished,in no apparent distress  SKIN: no rashes,no suspicious lesions  HEENT: atraumatic, normocephalic,ears and throat are clear  NECK: supple,no adenopathy,no bruits  LUNGS: clear to auscultation  CARDIO: RRR without murmur  GI: good BS's,no masses, HSM or tenderness  EXTREMITIES: no cyanosis, clubbing or edema  Tailbone not  examined    ASSESSMENT AND PLAN:   Diagnoses and all orders for this visit:    Iron deficiency  Continue over-the-counter iron  Advised green leafy vegetables  Encouraged her to eat some meat, beef or chicken that might help with her iron stores-     CBC With Differential With Platelet    Hypothyroidism, unspecified type  -Patient will start using the new prescription that she got and stop using the old bottle  We will recheck her TSH in 3 months   TSH and Free T4    Hyponatremia  -     Comp Metabolic Panel (14)    Tailbone pain  Local application of diclofenac 3-4 times a day-     diclofenac 1 % External Gel; Apply 2 g topically 4 (four) times daily.        The patient indicates understanding of these issues and agrees to the plan.  Imaging & Consults:  None  Meds & Refills for this Visit:  Requested Prescriptions     Signed Prescriptions Disp Refills    diclofenac 1 % External Gel 100 g 2     Sig: Apply 2 g topically 4 (four) times daily.     Orders Placed This Encounter   Procedures    CBC With Differential With Platelet    TSH and Free T4    Comp Metabolic Panel (14)             Tim Cee MD   [facility]  [address] [place] [place] [id number]    Electronically signed    This dictation was performed with a verbal recognition program (DRAGON) and it was checked for errors. It is possible that there are still dictated errors within this office note. If so, please bring any errors to my attention for an addendum. All efforts were made to ensure that this office note is accurate

## 2024-02-01 ENCOUNTER — TELEPHONE (OUTPATIENT)
Dept: FAMILY MEDICINE CLINIC | Facility: CLINIC | Age: 69
End: 2024-02-01

## 2024-04-01 ENCOUNTER — OFFICE VISIT (OUTPATIENT)
Dept: FAMILY MEDICINE CLINIC | Facility: CLINIC | Age: 69
End: 2024-04-01
Payer: MEDICAID

## 2024-04-01 VITALS
TEMPERATURE: 97 F | DIASTOLIC BLOOD PRESSURE: 72 MMHG | SYSTOLIC BLOOD PRESSURE: 120 MMHG | OXYGEN SATURATION: 97 % | HEART RATE: 79 BPM | BODY MASS INDEX: 25.16 KG/M2 | WEIGHT: 151 LBS | HEIGHT: 65 IN | RESPIRATION RATE: 18 BRPM

## 2024-04-01 DIAGNOSIS — E03.9 HYPOTHYROIDISM, UNSPECIFIED TYPE: ICD-10-CM

## 2024-04-01 DIAGNOSIS — E61.1 IRON DEFICIENCY: ICD-10-CM

## 2024-04-01 DIAGNOSIS — Z00.00 ENCOUNTER FOR ANNUAL PHYSICAL EXAM: Primary | ICD-10-CM

## 2024-04-01 DIAGNOSIS — Z12.31 ENCOUNTER FOR SCREENING MAMMOGRAM FOR MALIGNANT NEOPLASM OF BREAST: ICD-10-CM

## 2024-04-01 DIAGNOSIS — J32.9 SINUSITIS, UNSPECIFIED CHRONICITY, UNSPECIFIED LOCATION: ICD-10-CM

## 2024-04-01 RX ORDER — GUAIFENESIN AND DEXTROMETHORPHAN HYDROBROMIDE 600; 30 MG/1; MG/1
1 TABLET, EXTENDED RELEASE ORAL EVERY 12 HOURS
Qty: 20 TABLET | Refills: 0 | Status: SHIPPED | OUTPATIENT
Start: 2024-04-01

## 2024-04-01 RX ORDER — AZITHROMYCIN 250 MG/1
TABLET, FILM COATED ORAL
COMMUNITY
Start: 2024-03-30

## 2024-04-04 NOTE — PROGRESS NOTES
/72   Pulse 79   Temp 97.2 °F (36.2 °C) (Temporal)   Resp 18   Ht 5' 5\" (1.651 m)   Wt 151 lb (68.5 kg)   LMP  (LMP Unknown)   SpO2 97%   BMI 25.13 kg/m²  Body mass index is 25.13 kg/m².     Chief Complaint   Patient presents with    Medication Follow-Up    Cough       Jojo Yin is a 69 year old female who presents for a complete physical exam.   HPI:   Patient with history of hypothyroidism gastroesophageal reflux chronic anemia who is here today for her annual physical  She just returned from [place] and has been coughing for at least 10 days  She did not take any cough syrup but has been taking lozenges  She had her COVID test done that was negative  She started taking Zithromax on 3/30/2024  Her body ache and fever has resolved but the cough persists  Denies any other complaints    Wt Readings from Last 4 Encounters:   04/01/24 151 lb (68.5 kg)   01/22/24 151 lb 2 oz (68.5 kg)   11/27/23 152 lb (68.9 kg)   11/09/23 150 lb (68 kg)     Body mass index is 25.13 kg/m².   BP Readings from Last 3 Encounters:   04/01/24 120/72   01/22/24 120/74   11/27/23 124/64      Current Outpatient Medications   Medication Sig Dispense Refill    azithromycin 250 MG Oral Tab TAKE 2 TABLETS BY MOUTH TODAY AND 1 TABLET BY MOUTH DAILY FOR NEXT 4 DAYS      dextromethorphan-guaifenesin ER (MUCINEX DM)  MG Oral Tablet 12 Hr Take 1 tablet by mouth every 12 (twelve) hours. 20 tablet 0    hydrocortisone 2.5 % External Cream Apply 1 Application topically in the morning and 1 Application before bedtime. 30 g 0    diclofenac 1 % External Gel Apply 2 g topically 4 (four) times daily. 100 g 2    levothyroxine 112 MCG Oral Tab Take 1 tablet (112 mcg total) by mouth daily. 90 tablet 1    ferrous sulfate 75 (15 Fe) mg/mL Oral Solution Take 15 mL (225 mg total) by mouth 2 (two) times daily. 150 mL 0    Omeprazole 40 MG Oral Capsule Delayed Release Take 1 capsule (40 mg total) by mouth 2 (two) times daily before meals. 180  capsule 2      Past Medical History:   Diagnosis Date    Disorder of thyroid     Esophageal reflux     Hypothyroidism     Visual impairment     glasses      Past Surgical History:   Procedure Laterality Date    CHOLECYSTECTOMY      COLONOSCOPY N/A 11/9/2023    Procedure: COLONOSCOPY;  Surgeon: Virginia Galindo MD;  Location:  ENDOSCOPY    HYSTERECTOMY      REMOVAL GALLBLADDER      TOTAL ABDOM HYSTERECTOMY        Family History   Family history unknown: Yes      Social History:  Social History     Socioeconomic History    Marital status:    Tobacco Use    Smoking status: Never    Smokeless tobacco: Never   Vaping Use    Vaping Use: Never used   Substance and Sexual Activity    Alcohol use: Never    Drug use: Never      Exercise: none.  Diet: doesn't watch     REVIEW OF SYSTEMS:   GENERAL HEALTH: feels well otherwise, denies fever  SKIN: denies any unusual skin lesions or rashes  EYES: no visual complaints or deficits  HEENT: denies nasal congestion, sinus pain or sore throat; hearing loss negative  RESPIRATORY: denies shortness of breath, wheezing or cough  CARDIOVASCULAR: denies chest pain or MO; no palpitations  GI: denies nausea, vomiting, constipation, diarrhea; no rectal bleeding; no heartburn  MUSCULOSKELETAL: no joint complaints upper or lower extremities  NEURO: no sensory or motor complaint  PSYCHE: no symptoms of depression or anxiety  HEMATOLOGY: denies h/o anemia; denies bruising or excessive bleeding  ENDOCRINE: denies excessive thirst or urination; denies unexpected wt gain or wt loss  ALLERGY/IMM.: denies food or seasonal allergies    EXAM:   /72   Pulse 79   Temp 97.2 °F (36.2 °C) (Temporal)   Resp 18   Ht 5' 5\" (1.651 m)   Wt 151 lb (68.5 kg)   LMP  (LMP Unknown)   SpO2 97%   BMI 25.13 kg/m²  Body mass index is 25.13 kg/m².   GENERAL: well developed, well nourished,in no apparent distress  SKIN: no rashes,no suspicious lesions  HEENT: atraumatic, normocephalic,ears and  throat are clear  EYES:PERRLA, EOMI,conjunctiva are clear  NECK: supple,no adenopathy,no bruits  CHEST: no chest tenderness  LUNGS: clear to auscultation  CARDIO: RRR without murmur  GI: good BS's,no masses, HSM or tenderness  : Deferred  RECTAL:Deferred  MUSCULOSKELETAL: back is not tender,FROM of the back  EXTREMITIES: no cyanosis, clubbing or edema  NEURO: Oriented times three,cranial nerves are intact,motor and sensory are grossly intact    ASSESSMENT AND PLAN:   :Jojo was seen today for medication follow-up and cough.    Diagnoses and all orders for this visit:    Encounter for annual physical exam  Jojo Yin is a 69 year old female who presents for a complete physical exam.   Pt's weight is Body mass index is 25.13 kg/m².,   Exercise regularly --- Dietary measures discussed include diet about 1800 calories - Excercise regimen also reviewed as well as long term benefits on overall health.   Recommend at least 30 minutes a day 3-4 times a week of aerobic activity such as brisk walking, cycling, aerobics, or swimming.  Anerobic activities also encouraged for overall toning and strength/endurance building  Fasting labs ordered  Immunizations reviewed  Depression screen completed  Diet and exercise counseling given  Screening mammogram ordered  Colonoscopy up-to-date  -     CBC With Differential With Platelet  -     Comp Metabolic Panel (14)  -     Lipid Panel  -     TSH W Reflex To Free T4    Encounter for screening mammogram for malignant neoplasm of breast  -     [facility] ROBE 2D+3D SCREENING BILAT (CPT=77067/02854); Future    Iron deficiency  Check CBC  Hypothyroidism, unspecified type  Check TSH  Sinusitis, unspecified chronicity, unspecified location  Continue Zithromax  Push fluids  Starting the patient on Mucinex dm twice a day  Follow-up in a month  Other orders  -     dextromethorphan-guaifenesin ER (MUCINEX DM)  MG Oral Tablet 12 Hr; Take 1 tablet by mouth every 12 (twelve) hours.  -      hydrocortisone 2.5 % External Cream; Apply 1 Application topically in the morning and 1 Application before bedtime.      ,  The patient indicates understanding of these issues and agrees to the plan.  .      No follow-ups on file.        Tim Cee MD, 4/3/2024, 8:32 PM      This dictation was performed with a verbal recognition program (DRAGON) and it was checked for errors. It is possible that there are still dictated errors within this office note. If so, please bring any errors to my attention for an addendum. All efforts were made to ensure that this office note is accurate

## 2024-04-10 DIAGNOSIS — K21.9 GASTROESOPHAGEAL REFLUX DISEASE, UNSPECIFIED WHETHER ESOPHAGITIS PRESENT: ICD-10-CM

## 2024-04-10 RX ORDER — OMEPRAZOLE 40 MG/1
40 CAPSULE, DELAYED RELEASE ORAL
Qty: 180 CAPSULE | Refills: 1 | Status: SHIPPED | OUTPATIENT
Start: 2024-04-10

## 2024-04-16 LAB
ABSOLUTE BASOPHILS: 59 CELLS/UL (ref 0–200)
ABSOLUTE EOSINOPHILS: 267 CELLS/UL (ref 15–500)
ABSOLUTE LYMPHOCYTES: 2529 CELLS/UL (ref 850–3900)
ABSOLUTE MONOCYTES: 566 CELLS/UL (ref 200–950)
ABSOLUTE NEUTROPHILS: 3081 CELLS/UL (ref 1500–7800)
ALBUMIN/GLOBULIN RATIO: 1.2 (CALC) (ref 1–2.5)
ALBUMIN: 3.8 G/DL (ref 3.6–5.1)
ALKALINE PHOSPHATASE: 64 U/L (ref 37–153)
ALT: 9 U/L (ref 6–29)
AST: 16 U/L (ref 10–35)
BASOPHILS: 0.9 %
BILIRUBIN, TOTAL: 0.8 MG/DL (ref 0.2–1.2)
BUN: 13 MG/DL (ref 7–25)
CALCIUM: 9.4 MG/DL (ref 8.6–10.4)
CARBON DIOXIDE: 30 MMOL/L (ref 20–32)
CHLORIDE: 101 MMOL/L (ref 98–110)
CHOL/HDLC RATIO: 2.6 (CALC)
CHOLESTEROL, TOTAL: 164 MG/DL
CREATININE: 0.7 MG/DL (ref 0.6–1)
EGFR: 93 ML/MIN/1.73M2
EOSINOPHILS: 4.1 %
GLOBULIN: 3.3 G/DL (CALC) (ref 1.9–3.7)
GLUCOSE: 80 MG/DL (ref 65–99)
HDL CHOLESTEROL: 62 MG/DL
HEMATOCRIT: 41.7 % (ref 35–45)
HEMOGLOBIN: 12.5 G/DL (ref 11.7–15.5)
LDL-CHOLESTEROL: 76 MG/DL (CALC)
LYMPHOCYTES: 38.9 %
MCH: 23.3 PG (ref 27–33)
MCHC: 30 G/DL (ref 32–36)
MCV: 77.8 FL (ref 80–100)
MONOCYTES: 8.7 %
MPV: 9.1 FL (ref 7.5–12.5)
NEUTROPHILS: 47.4 %
NON-HDL CHOLESTEROL: 102 MG/DL (CALC)
PLATELET COUNT: 330 THOUSAND/UL (ref 140–400)
POTASSIUM: 4.3 MMOL/L (ref 3.5–5.3)
PROTEIN, TOTAL: 7.1 G/DL (ref 6.1–8.1)
RDW: 14.7 % (ref 11–15)
RED BLOOD CELL COUNT: 5.36 MILLION/UL (ref 3.8–5.1)
SODIUM: 135 MMOL/L (ref 135–146)
TRIGLYCERIDES: 163 MG/DL
TSH W/REFLEX TO FT4: 1.73 MIU/L (ref 0.4–4.5)
WHITE BLOOD CELL COUNT: 6.5 THOUSAND/UL (ref 3.8–10.8)

## 2024-04-23 ENCOUNTER — MED REC SCAN ONLY (OUTPATIENT)
Dept: FAMILY MEDICINE CLINIC | Facility: CLINIC | Age: 69
End: 2024-04-23

## 2024-04-29 ENCOUNTER — OFFICE VISIT (OUTPATIENT)
Dept: FAMILY MEDICINE CLINIC | Facility: CLINIC | Age: 69
End: 2024-04-29
Payer: MEDICAID

## 2024-04-29 VITALS
WEIGHT: 155 LBS | OXYGEN SATURATION: 98 % | TEMPERATURE: 97 F | SYSTOLIC BLOOD PRESSURE: 100 MMHG | RESPIRATION RATE: 16 BRPM | HEART RATE: 72 BPM | BODY MASS INDEX: 25.83 KG/M2 | DIASTOLIC BLOOD PRESSURE: 64 MMHG | HEIGHT: 65 IN

## 2024-04-29 DIAGNOSIS — E61.1 IRON DEFICIENCY: Primary | ICD-10-CM

## 2024-04-29 DIAGNOSIS — K21.9 GASTROESOPHAGEAL REFLUX DISEASE, UNSPECIFIED WHETHER ESOPHAGITIS PRESENT: ICD-10-CM

## 2024-04-29 DIAGNOSIS — E03.9 HYPOTHYROIDISM, UNSPECIFIED TYPE: ICD-10-CM

## 2024-04-29 PROCEDURE — 99213 OFFICE O/P EST LOW 20 MIN: CPT | Performed by: FAMILY MEDICINE

## 2024-04-29 RX ORDER — LEVOTHYROXINE SODIUM 112 UG/1
112 TABLET ORAL DAILY
Qty: 90 TABLET | Refills: 1 | Status: SHIPPED | OUTPATIENT
Start: 2024-04-29

## 2024-04-29 RX ORDER — OMEPRAZOLE 40 MG/1
40 CAPSULE, DELAYED RELEASE ORAL
Qty: 180 CAPSULE | Refills: 1 | Status: SHIPPED | OUTPATIENT
Start: 2024-04-29

## 2024-04-29 NOTE — PROGRESS NOTES
/64   Pulse 72   Temp 97 °F (36.1 °C) (Temporal)   Resp 16   Ht 5' 5\" (1.651 m)   Wt 155 lb (70.3 kg)   LMP  (LMP Unknown)   SpO2 98%   BMI 25.79 kg/m²               Chief Complaint   Patient presents with    Lab Results        HPI;    Jojo Yin is a 69 year old female.  Who is here today to discuss the result of the blood work that was done 2 weeks ago  Her TSH was found to be normal  Lipid profile showed slightly elevated triglycerides at 163, CMP was unremarkable  And CBC showed improved hemoglobin at 12.5 hematocrit 41 with a MCV of 77.8  Patient remains asymptomatic  Patient is unable to tolerate iron sulfate due to extreme acid reflux  She has been taking iron sulfate 65 mg tablet once a day she has also increased her consumption of green leafy vegetables      Wt Readings from Last 3 Encounters:   04/29/24 155 lb (70.3 kg)   04/01/24 151 lb (68.5 kg)   01/22/24 151 lb 2 oz (68.5 kg)     BP Readings from Last 3 Encounters:   04/29/24 100/64   04/01/24 120/72   01/22/24 120/74         ALLERGIES:  No Known Allergies  Current Outpatient Medications   Medication Sig Dispense Refill    Omeprazole 40 MG Oral Capsule Delayed Release Take 1 capsule (40 mg total) by mouth 2 (two) times daily before meals. 180 capsule 1    diclofenac 1 % External Gel Apply 2 g topically 4 (four) times daily. 100 g 2    levothyroxine 112 MCG Oral Tab Take 1 tablet (112 mcg total) by mouth daily. 90 tablet 1    hydrocortisone 2.5 % External Cream Apply 1 Application topically in the morning and 1 Application before bedtime. 30 g 0    ferrous sulfate 75 (15 Fe) mg/mL Oral Solution Take 15 mL (225 mg total) by mouth 2 (two) times daily. 150 mL 0      Past Medical History:    Disorder of thyroid    Esophageal reflux    Hypothyroidism    Visual impairment    glasses      Social History:  Social History     Socioeconomic History    Marital status:    Tobacco Use    Smoking status: Never    Smokeless tobacco: Never    Vaping Use    Vaping status: Never Used   Substance and Sexual Activity    Alcohol use: Never    Drug use: Never     Social Determinants of Health      [email or web address]        REVIEW OF SYSTEMS:   A comprehensive 10 point review of systems was completed.  Pertinent positives and negatives noted in the the HPI.   EXAM:   /64   Pulse 72   Temp 97 °F (36.1 °C) (Temporal)   Resp 16   Ht 5' 5\" (1.651 m)   Wt 155 lb (70.3 kg)   LMP  (LMP Unknown)   SpO2 98%   BMI 25.79 kg/m²   GENERAL: well developed, well nourished,in no apparent distress  SKIN: no rashes,no suspicious lesions  NECK: supple,no adenopathy,no bruits  LUNGS: clear to auscultation  CARDIO: RRR without murmur  EXTREMITIES: no cyanosis, clubbing or edema    ASSESSMENT AND PLAN:   Diagnoses and all orders for this visit:    Iron deficiency  Patient is advised to increase her iron  Make iron sulfate 65 mg twice a day  Will recheck her CBC in 3 months  Gastroesophageal reflux disease, unspecified whether esophagitis present  -Refills given for omeprazole in case she needs it when taking      Omeprazole 40 MG Oral Capsule Delayed Release; Take 1 capsule (40 mg total) by mouth 2 (two) times daily before meals.    Hypothyroidism, unspecified type  Advised to continue levothyroxine at 112 mcg daily  Will recheck her TSH and T4 in 6 months  Other orders  -     diclofenac 1 % External Gel; Apply 2 g topically 4 (four) times daily.  -     levothyroxine 112 MCG Oral Tab; Take 1 tablet (112 mcg total) by mouth daily.        The patient indicates understanding of these issues and agrees to the plan.  Imaging & Consults:  None  Meds & Refills for this Visit:  Requested Prescriptions     Signed Prescriptions Disp Refills    Omeprazole 40 MG Oral Capsule Delayed Release 180 capsule 1     Sig: Take 1 capsule (40 mg total) by mouth 2 (two) times daily before meals.    diclofenac 1 % External Gel 100 g 2     Sig: Apply 2 g  topically 4 (four) times daily.    levothyroxine 112 MCG Oral Tab 90 tablet 1     Sig: Take 1 tablet (112 mcg total) by mouth daily.     No orders of the defined types were placed in this encounter.            Tim Cee MD   [facility]  [address] [address]    Electronically signed    This dictation was performed with a verbal recognition program (DRAGON) and it was checked for errors. It is possible that there are still dictated errors within this office note. If so, please bring any errors to my attention for an addendum. All efforts were made to ensure that this office note is accurate

## 2024-07-30 ENCOUNTER — NURSE TRIAGE (OUTPATIENT)
Dept: INTERNAL MEDICINE CLINIC | Facility: CLINIC | Age: 69
End: 2024-07-30

## 2024-07-30 NOTE — TELEPHONE ENCOUNTER
Action Requested: Summary for Provider     []  Critical Lab, Recommendations Needed  [] Need Additional Advice  []   FYI    []   Need Orders  [] Need Medications Sent to Pharmacy  []  Other     SUMMARY: Received call from pt. Having difficulty triaging pt due to language barrier, offered , pt agreeable. Called pt with  [phone]. Per pt, for 1 mo she has had pain in L arm when moving arm. Denies injury/discoloration. Denies shortness of breath/chest pain. Offered appointment with Dr. Cee this afternoon, but pt needing evening appointment. Next available evening appointment is 8/5, pt agreeable. UC warnings provided, pt stated understanding and agreed to plan.     Reason for call: Acute  Onset: Data Unavailable                       Reason for Disposition   MILD pain (e.g., does not interfere with normal activities) and present > 7 days    Protocols used: Arm Pain-A-OH

## 2024-08-01 ENCOUNTER — OFFICE VISIT (OUTPATIENT)
Dept: FAMILY MEDICINE CLINIC | Facility: CLINIC | Age: 69
End: 2024-08-01
Payer: MEDICAID

## 2024-08-01 VITALS
SYSTOLIC BLOOD PRESSURE: 104 MMHG | WEIGHT: 154 LBS | BODY MASS INDEX: 25.66 KG/M2 | RESPIRATION RATE: 16 BRPM | TEMPERATURE: 97 F | HEART RATE: 75 BPM | HEIGHT: 65 IN | OXYGEN SATURATION: 97 % | DIASTOLIC BLOOD PRESSURE: 62 MMHG

## 2024-08-01 DIAGNOSIS — M25.512 CHRONIC LEFT SHOULDER PAIN: Primary | ICD-10-CM

## 2024-08-01 DIAGNOSIS — G89.29 CHRONIC LEFT SHOULDER PAIN: Primary | ICD-10-CM

## 2024-08-01 PROCEDURE — 99213 OFFICE O/P EST LOW 20 MIN: CPT | Performed by: FAMILY MEDICINE

## 2024-08-01 NOTE — PROGRESS NOTES
/62   Pulse 75   Temp 97.3 °F (36.3 °C) (Temporal)   Resp 16   Ht 5' 5\" (1.651 m)   Wt 154 lb (69.9 kg)   LMP  (LMP Unknown)   SpO2 97%   BMI 25.63 kg/m²               Chief Complaint   Patient presents with    Arm Pain     Left arm        HPI;    Jojo Yin is a 69 year old female.  History of hypothyroidism gastroesophageal reflux and iron deficiency anemia is here today with chronic left shoulder pain  She denies any history of trauma or falls  The pain is located on the left shoulder reproducible by extension of the shoulder and internal rotation  There is no swelling or redness  Patient has been taking over-the-counter Tylenol without any significant help        Wt Readings from Last 3 Encounters:   08/01/24 154 lb (69.9 kg)   04/29/24 155 lb (70.3 kg)   04/01/24 151 lb (68.5 kg)     BP Readings from Last 3 Encounters:   08/01/24 104/62   04/29/24 100/64   04/01/24 120/72         ALLERGIES:  No Known Allergies  Current Outpatient Medications   Medication Sig Dispense Refill    Omeprazole 40 MG Oral Capsule Delayed Release Take 1 capsule (40 mg total) by mouth 2 (two) times daily before meals. 180 capsule 1    levothyroxine 112 MCG Oral Tab Take 1 tablet (112 mcg total) by mouth daily. 90 tablet 1    diclofenac 1 % External Gel Apply 2 g topically 4 (four) times daily. (Patient not taking: Reported on 8/1/2024) 100 g 2    hydrocortisone 2.5 % External Cream Apply 1 Application topically in the morning and 1 Application before bedtime. (Patient not taking: Reported on 8/1/2024) 30 g 0    ferrous sulfate 75 (15 Fe) mg/mL Oral Solution Take 15 mL (225 mg total) by mouth 2 (two) times daily. (Patient not taking: Reported on 8/1/2024) 150 mL 0      Past Medical History:    Disorder of thyroid    Esophageal reflux    Hypothyroidism    Visual impairment    glasses      Social History:  Social History     Socioeconomic History    Marital status:    Tobacco Use    Smoking status: Never    Smokeless  tobacco: Never   Vaping Use    Vaping status: Never Used   Substance and Sexual Activity    Alcohol use: Never    Drug use: Never     Social Determinants of Health      [facility]        REVIEW OF SYSTEMS:   GENERAL HEALTH: feels well otherwise  SKIN: denies any unusual skin lesions or rashes  RESPIRATORY: denies shortness of breath with exertion  CARDIOVASCULAR: denies chest pain on exertion  GI: denies abdominal pain and denies heartburn  NEURO: denies headaches  EXAM:   /62   Pulse 75   Temp 97.3 °F (36.3 °C) (Temporal)   Resp 16   Ht 5' 5\" (1.651 m)   Wt 154 lb (69.9 kg)   LMP  (LMP Unknown)   SpO2 97%   BMI 25.63 kg/m²   GENERAL: well developed, well nourished,in no apparent distress  LUNGS: clear to auscultation  CARDIO: RRR without murmur  Musculoskeletal exam-left shoulder, decreased range of motion especially internal and external rotation  The pain is reproducible on extension of the shoulder with rotation    ASSESSMENT AND PLAN:   Diagnoses and all orders for this visit:    Chronic left shoulder pain  Differential diagnosis includes chronic supraspinatus tendinosis  Patient is advised to continue diclofenac gel and Tylenol  I am sending her to physical therapy for evaluation and treatment  -     Physical Therapy Referral - [place] Location        The patient indicates understanding of these issues and agrees to the plan.  Imaging & Consults:  OP REFERRAL TO [facility]  Meds & Refills for this Visit:  Requested Prescriptions      No prescriptions requested or ordered in this encounter     No orders of the defined types were placed in this encounter.            Tim Cee MD   [facility]  [address], [address] [address]    Electronically signed    This dictation was performed with a verbal recognition program (DRAGON) and it was checked for errors. It is possible that there are still dictated errors  within this office note. If so, please bring any errors to my attention for an addendum. All efforts were made to ensure that this office note is accurate

## 2024-09-16 ENCOUNTER — OFFICE VISIT (OUTPATIENT)
Dept: FAMILY MEDICINE CLINIC | Facility: CLINIC | Age: 69
End: 2024-09-16
Payer: MEDICAID

## 2024-09-16 VITALS
BODY MASS INDEX: 26.49 KG/M2 | WEIGHT: 159 LBS | HEIGHT: 65 IN | SYSTOLIC BLOOD PRESSURE: 122 MMHG | DIASTOLIC BLOOD PRESSURE: 68 MMHG | TEMPERATURE: 97 F | RESPIRATION RATE: 16 BRPM | OXYGEN SATURATION: 98 % | HEART RATE: 78 BPM

## 2024-09-16 DIAGNOSIS — K21.9 GASTROESOPHAGEAL REFLUX DISEASE, UNSPECIFIED WHETHER ESOPHAGITIS PRESENT: ICD-10-CM

## 2024-09-16 DIAGNOSIS — E03.9 HYPOTHYROIDISM, UNSPECIFIED TYPE: Primary | ICD-10-CM

## 2024-09-16 PROCEDURE — 99214 OFFICE O/P EST MOD 30 MIN: CPT | Performed by: FAMILY MEDICINE

## 2024-09-16 RX ORDER — LEVOTHYROXINE SODIUM 112 UG/1
112 TABLET ORAL DAILY
Qty: 90 TABLET | Refills: 1 | Status: SHIPPED | OUTPATIENT
Start: 2024-09-16

## 2024-09-16 RX ORDER — OMEPRAZOLE 40 MG/1
40 CAPSULE, DELAYED RELEASE ORAL
Qty: 180 CAPSULE | Refills: 1 | Status: SHIPPED | OUTPATIENT
Start: 2024-09-16

## 2024-09-16 NOTE — PROGRESS NOTES
/68   Pulse 78   Temp 97.2 °F (36.2 °C) (Temporal)   Resp 16   Ht 5' 5\" (1.651 m)   Wt 159 lb (72.1 kg)   LMP  (LMP Unknown)   SpO2 98%   BMI 26.46 kg/m²               Chief Complaint   Patient presents with    Medication Follow-Up    Swelling     Left ankle         HPI;    Jojo Yin is a 69 year old female.  Was complaining of swelling at the ankles bilaterally, left more than the right.  Noticed few days ago  Patient denies any pain  She is also complaining of swelling under the eyelids for few days    History of hypothyroidism currently on levothyroxine patient has been noncompliant with levothyroxine  She states that she takes at least 2-3 times a week    She is also here for follow-up on her gastroesophageal reflux  Since he started taking omeprazole twice a day her symptoms are better denies any heartburn or abdominal pain  She has gained some weight      Wt Readings from Last 3 Encounters:   09/16/24 159 lb (72.1 kg)   08/01/24 154 lb (69.9 kg)   04/29/24 155 lb (70.3 kg)     BP Readings from Last 3 Encounters:   09/16/24 122/68   08/01/24 104/62   04/29/24 100/64         ALLERGIES:  No Known Allergies  Current Outpatient Medications   Medication Sig Dispense Refill    levothyroxine 112 MCG Oral Tab Take 1 tablet (112 mcg total) by mouth daily. 90 tablet 1    Omeprazole 40 MG Oral Capsule Delayed Release Take 1 capsule (40 mg total) by mouth 2 (two) times daily before meals. 180 capsule 1    diclofenac 1 % External Gel Apply 2 g topically 4 (four) times daily. 100 g 2    hydrocortisone 2.5 % External Cream Apply 1 Application topically in the morning and 1 Application before bedtime. 30 g 0      Past Medical History:    Disorder of thyroid    Esophageal reflux    Hypothyroidism    Visual impairment    glasses      Social History:  Social History     Socioeconomic History    Marital status:    Tobacco Use    Smoking status: Never    Smokeless tobacco: Never   Vaping Use    Vaping  status: Never Used   Substance and Sexual Activity    Alcohol use: Never    Drug use: Never     Social Determinants of Health      [facility]        REVIEW OF SYSTEMS:   GENERAL HEALTH: feels well otherwise  SKIN: denies any unusual skin lesions or rashes  RESPIRATORY: denies shortness of breath with exertion  CARDIOVASCULAR: denies chest pain on exertion  GI: denies abdominal pain and denies heartburn  NEURO: denies headaches  EXAM:   /68   Pulse 78   Temp 97.2 °F (36.2 °C) (Temporal)   Resp 16   Ht 5' 5\" (1.651 m)   Wt 159 lb (72.1 kg)   LMP  (LMP Unknown)   SpO2 98%   BMI 26.46 kg/m²   GENERAL: well developed, well nourished,in no apparent distress  SKIN: no rashes,no suspicious lesions  HEENT: atraumatic, normocephalic,ears and throat are clear  NECK: supple,no adenopathy,no bruits  LUNGS: clear to auscultation  CARDIO: RRR without murmur  GI: good BS's,no masses, HSM or tenderness  EXTREMITIES:-Nonpitting swelling of the ankles    ASSESSMENT AND PLAN:   Diagnoses and all orders for this visit:    Hypothyroidism, unspecified type  I had a long discussion with the patient regarding hypothyroidism and the swelling on her feet and her face is probably related to noncompliance with medication  Patient is advised to take her levothyroxine regularly every day  Refills given for levothyroxine  We will check her TSH  -     Comp Metabolic Panel (14)  -     CBC With Differential With Platelet  -     TSH and Free T4    Gastroesophageal reflux disease, unspecified whether esophagitis present  Continue omeprazole twice a day  Refills given  -     Omeprazole 40 MG Oral Capsule Delayed Release; Take 1 capsule (40 mg total) by mouth 2 (two) times daily before meals.    Other orders  -     levothyroxine 112 MCG Oral Tab; Take 1 tablet (112 mcg total) by mouth daily.        The patient indicates understanding of these issues and agrees to the plan.  Imaging &  Consults:  None  Meds & Refills for this Visit:  Requested Prescriptions     Signed Prescriptions Disp Refills    levothyroxine 112 MCG Oral Tab 90 tablet 1     Sig: Take 1 tablet (112 mcg total) by mouth daily.    Omeprazole 40 MG Oral Capsule Delayed Release 180 capsule 1     Sig: Take 1 capsule (40 mg total) by mouth 2 (two) times daily before meals.     Orders Placed This Encounter   Procedures    Comp Metabolic Panel (14)    CBC With Differential With Platelet    TSH and Free T4             Tim Cee MD   [facility]  [address] [address]    Electronically signed    This dictation was performed with a verbal recognition program (DRAGON) and it was checked for errors. It is possible that there are still dictated errors within this office note. If so, please bring any errors to my attention for an addendum. All efforts were made to ensure that this office note is accurate

## 2024-09-20 LAB
ABSOLUTE BASOPHILS: 27 CELLS/UL (ref 0–200)
ABSOLUTE EOSINOPHILS: 333 CELLS/UL (ref 15–500)
ABSOLUTE LYMPHOCYTES: 2353 CELLS/UL (ref 850–3900)
ABSOLUTE MONOCYTES: 592 CELLS/UL (ref 200–950)
ABSOLUTE NEUTROPHILS: 3495 CELLS/UL (ref 1500–7800)
ALBUMIN/GLOBULIN RATIO: 1.1 (CALC) (ref 1–2.5)
ALBUMIN: 3.9 G/DL (ref 3.6–5.1)
ALKALINE PHOSPHATASE: 57 U/L (ref 37–153)
ALT: 13 U/L (ref 6–29)
AST: 18 U/L (ref 10–35)
BASOPHILS: 0.4 %
BILIRUBIN, TOTAL: 0.7 MG/DL (ref 0.2–1.2)
BUN: 11 MG/DL (ref 7–25)
CALCIUM: 9.5 MG/DL (ref 8.6–10.4)
CARBON DIOXIDE: 29 MMOL/L (ref 20–32)
CHLORIDE: 93 MMOL/L (ref 98–110)
CREATININE: 0.72 MG/DL (ref 0.6–1)
EGFR: 90 ML/MIN/1.73M2
EOSINOPHILS: 4.9 %
GLOBULIN: 3.5 G/DL (CALC) (ref 1.9–3.7)
GLUCOSE: 83 MG/DL (ref 65–99)
HEMATOCRIT: 41.8 % (ref 35–45)
HEMOGLOBIN: 12.5 G/DL (ref 11.7–15.5)
LYMPHOCYTES: 34.6 %
MCH: 24.1 PG (ref 27–33)
MCHC: 29.9 G/DL (ref 32–36)
MCV: 80.5 FL (ref 80–100)
MONOCYTES: 8.7 %
MPV: 8.9 FL (ref 7.5–12.5)
NEUTROPHILS: 51.4 %
PLATELET COUNT: 271 THOUSAND/UL (ref 140–400)
POTASSIUM: 4.4 MMOL/L (ref 3.5–5.3)
PROTEIN, TOTAL: 7.4 G/DL (ref 6.1–8.1)
RDW: 15.3 % (ref 11–15)
RED BLOOD CELL COUNT: 5.19 MILLION/UL (ref 3.8–5.1)
SODIUM: 130 MMOL/L (ref 135–146)
T4, FREE: 1.2 NG/DL (ref 0.8–1.8)
TSH: 2.87 MIU/L (ref 0.4–4.5)
WHITE BLOOD CELL COUNT: 6.8 THOUSAND/UL (ref 3.8–10.8)

## 2024-09-30 ENCOUNTER — OFFICE VISIT (OUTPATIENT)
Dept: FAMILY MEDICINE CLINIC | Facility: CLINIC | Age: 69
End: 2024-09-30
Payer: MEDICAID

## 2024-09-30 VITALS
BODY MASS INDEX: 26.33 KG/M2 | SYSTOLIC BLOOD PRESSURE: 110 MMHG | HEART RATE: 76 BPM | OXYGEN SATURATION: 96 % | RESPIRATION RATE: 16 BRPM | WEIGHT: 158 LBS | TEMPERATURE: 97 F | HEIGHT: 65 IN | DIASTOLIC BLOOD PRESSURE: 52 MMHG

## 2024-09-30 DIAGNOSIS — E03.9 HYPOTHYROIDISM, UNSPECIFIED TYPE: Primary | ICD-10-CM

## 2024-09-30 DIAGNOSIS — E61.1 IRON DEFICIENCY: ICD-10-CM

## 2024-09-30 DIAGNOSIS — Z23 NEED FOR VACCINATION: ICD-10-CM

## 2024-09-30 RX ORDER — LEVOTHYROXINE SODIUM 100 UG/1
100 TABLET ORAL DAILY
Qty: 90 TABLET | Refills: 2 | Status: SHIPPED | OUTPATIENT
Start: 2024-09-30

## 2024-10-01 NOTE — PROGRESS NOTES
/52   Pulse 76   Temp 97.3 °F (36.3 °C) (Temporal)   Resp 16   Ht 5' 5\" (1.651 m)   Wt 158 lb (71.7 kg)   LMP  (LMP Unknown)   SpO2 96%   BMI 26.29 kg/m²               Chief Complaint   Patient presents with    Test Results        HPI;    Jojo Yin is a 69 year old female.  With history of hypothyroidism, iron deficiency anemia is here today to discuss the result of the blood test  Patient's most recent TSH for therapeutic  She states that she has not been taking levothyroxine 112 mcg instead she has continued to take levothyroxine 100 mcg daily  Her MCV has improved and is now normal  Patient is taking over-the-counter natural iron tablets  She has history of constipation due to iron sulfate      Wt Readings from Last 3 Encounters:   09/30/24 158 lb (71.7 kg)   09/16/24 159 lb (72.1 kg)   08/01/24 154 lb (69.9 kg)     BP Readings from Last 3 Encounters:   09/30/24 110/52   09/16/24 122/68   08/01/24 104/62         ALLERGIES:  No Known Allergies  Current Outpatient Medications   Medication Sig Dispense Refill    levothyroxine 100 MCG Oral Tab Take 1 tablet (100 mcg total) by mouth daily. 90 tablet 2    Omeprazole 40 MG Oral Capsule Delayed Release Take 1 capsule (40 mg total) by mouth 2 (two) times daily before meals. 180 capsule 1    diclofenac 1 % External Gel Apply 2 g topically 4 (four) times daily. 100 g 2    hydrocortisone 2.5 % External Cream Apply 1 Application topically in the morning and 1 Application before bedtime. 30 g 0      Past Medical History:    Disorder of thyroid    Esophageal reflux    Hypothyroidism    Visual impairment    glasses      Social History:  Social History     Socioeconomic History    Marital status:    Tobacco Use    Smoking status: Never    Smokeless tobacco: Never   Vaping Use    Vaping status: Never Used   Substance and Sexual Activity    Alcohol use: Never    Drug use: Never     Social Determinants of Health      Received from [employer] [employer]     [facility]        REVIEW OF SYSTEMS:   A comprehensive 10 point review of systems was completed.  Pertinent positives and negatives noted in the the HPI.   EXAM:   /52   Pulse 76   Temp 97.3 °F (36.3 °C) (Temporal)   Resp 16   Ht 5' 5\" (1.651 m)   Wt 158 lb (71.7 kg)   LMP  (LMP Unknown)   SpO2 96%   BMI 26.29 kg/m²   GENERAL: well developed, well nourished,in no apparent distress  SKIN: no rashes,no suspicious lesions  NECK: supple,no adenopathy,no bruits  LUNGS: clear to auscultation  CARDIO: RRR without murmur  EXTREMITIES: no cyanosis, clubbing or edema    ASSESSMENT AND PLAN:   Diagnoses and all orders for this visit:    Hypothyroidism, unspecified type  Refills sent for levothyroxine 100 mcg daily  To recheck her TSH in 6 months  Iron deficiency  Continue iron tablets unchanged  Recheck CBC in 6 months  Need for vaccination  -     INFLUENZA VAC HIGH DOSE PRSV FREE    Other orders  -     levothyroxine 100 MCG Oral Tab; Take 1 tablet (100 mcg total) by mouth daily.        The patient indicates understanding of these issues and agrees to the plan.  Imaging & Consults:  INFLUENZA VAC HIGH DOSE PRSV FREE  Meds & Refills for this Visit:  Requested Prescriptions     Signed Prescriptions Disp Refills    levothyroxine 100 MCG Oral Tab 90 tablet 2     Sig: Take 1 tablet (100 mcg total) by mouth daily.     Orders Placed This Encounter   Procedures    INFLUENZA VAC HIGH DOSE PRSV FREE             Tim Cee MD   [facility]  [address], [address] [address]    Electronically signed    This dictation was performed with a verbal recognition program (DRAGON) and it was checked for errors. It is possible that there are still dictated errors within this office note. If so, please bring any errors to my attention for an addendum. All efforts were made to ensure that this office note is accurate

## 2024-11-01 ENCOUNTER — NURSE TRIAGE (OUTPATIENT)
Dept: INTERNAL MEDICINE CLINIC | Facility: CLINIC | Age: 69
End: 2024-11-01

## 2024-11-01 NOTE — TELEPHONE ENCOUNTER
Action Requested: Summary for Provider     []  Critical Lab, Recommendations Needed  [x] Need Additional Advice  []   FYI    []   Need Orders  [] Need Medications Sent to Pharmacy  []  Other     SUMMARY: Spoke to patient with Carmen  #[id number]. She asked if she could come in with son on Monday at his 5:15 appointment. Patient reports a rash on front of neck x 1 week. Area about the side of her hand, skin is red and very itchy. She denies pain or fever. Has not tried any OTC creams.   Advised patient may not be able to accommodate both at 15 min appointment on Monday. Recommended patient go to [facility] today for evaluation of rash. [facility] info sent to patient via [employer].     Reason for call: Rash  Onset: 1 week       Reason for Disposition   Patient wants to be seen    Protocols used: Rash or Redness - [id number]

## 2024-11-05 NOTE — TELEPHONE ENCOUNTER
Attempted to call pt's mobile phone. Left VM   Attempted to call pt's son. Left VM to call back .    MCM from 4 days ago was never read.     No call back letter mailed to home address.

## 2024-11-18 ENCOUNTER — PATIENT MESSAGE (OUTPATIENT)
Dept: FAMILY MEDICINE CLINIC | Facility: CLINIC | Age: 69
End: 2024-11-18

## 2024-11-24 DIAGNOSIS — K21.9 GASTROESOPHAGEAL REFLUX DISEASE, UNSPECIFIED WHETHER ESOPHAGITIS PRESENT: ICD-10-CM

## 2024-11-27 RX ORDER — OMEPRAZOLE 40 MG/1
40 CAPSULE, DELAYED RELEASE ORAL
Qty: 180 CAPSULE | Refills: 3 | Status: SHIPPED | OUTPATIENT
Start: 2024-11-27

## 2024-11-27 NOTE — TELEPHONE ENCOUNTER
Refill passed per [facility] protocol.     Requested Prescriptions   Pending Prescriptions Disp Refills    OMEPRAZOLE 40 MG Oral Capsule Delayed Release [Pharmacy Med Name: omeprazole 40 mg capsule,delayed release] 180 capsule 1     Sig: TAKE 1 CAPSULE BY MOUTH TWICE DAILY BEFORE MEALS       Gastrointestional Medication Protocol Passed - 11/27/2024  2:07 PM        Passed - In person appointment or virtual visit in the past 12 mos or appointment in next 3 mos     Recent Outpatient Visits              1 month ago Hypothyroidism, unspecified type    [facility], [address] [place], [place] [address]Pauly Kaleem, MD    Office Visit    2 months ago Hypothyroidism, unspecified type    [facility], [address] [place], [place] [address]Pauly Kaleem, MD    Office Visit    3 months ago Chronic left shoulder pain    [facility], [address] [place], [place] [address]Pauly Kaleem, MD    Office Visit    7 months ago Iron deficiency    [facility], [address] [place], [place] [address]Pauly Kaleem, MD    Office Visit    8 months ago Encounter for annual physical exam    [facility] [address] [place], [place] [address]Pauly Kaleem, MD    Office Visit                                 Recent Outpatient Visits              1 month ago Hypothyroidism, unspecified type    [facility], [address] [place], [place] [address]Pauly Kaleem, MD    Office Visit    2 months ago Hypothyroidism, unspecified type    [facility], [address] [place], [place] [address]Pauly Kaleem, MD    Office Visit    3 months ago Chronic left shoulder pain    [facility], [address] [place], [place] [address]Pauly Kaleem, MD    Office Visit    7 months ago Iron deficiency    [facility] [address] [place], [place] [address]Pauly Kaleem, MD    Office Visit    8 months ago Encounter for annual physical exam    [facility] [address] [place], [place] [address]Pauly Kaleem, MD    Office Visit

## 2025-02-03 ENCOUNTER — OFFICE VISIT (OUTPATIENT)
Dept: FAMILY MEDICINE CLINIC | Facility: CLINIC | Age: 70
End: 2025-02-03
Payer: MEDICAID

## 2025-02-03 ENCOUNTER — TELEPHONE (OUTPATIENT)
Dept: FAMILY MEDICINE CLINIC | Facility: CLINIC | Age: 70
End: 2025-02-03

## 2025-02-03 VITALS
RESPIRATION RATE: 16 BRPM | WEIGHT: 160.13 LBS | HEIGHT: 65 IN | TEMPERATURE: 97 F | SYSTOLIC BLOOD PRESSURE: 110 MMHG | OXYGEN SATURATION: 98 % | BODY MASS INDEX: 26.68 KG/M2 | HEART RATE: 76 BPM | DIASTOLIC BLOOD PRESSURE: 64 MMHG

## 2025-02-03 DIAGNOSIS — N64.4 BREAST PAIN: Primary | ICD-10-CM

## 2025-02-03 DIAGNOSIS — E03.9 HYPOTHYROIDISM, UNSPECIFIED TYPE: ICD-10-CM

## 2025-02-03 DIAGNOSIS — E87.1 HYPONATREMIA: ICD-10-CM

## 2025-02-03 DIAGNOSIS — Z12.31 ENCOUNTER FOR SCREENING MAMMOGRAM FOR MALIGNANT NEOPLASM OF BREAST: ICD-10-CM

## 2025-02-03 DIAGNOSIS — K21.9 GASTROESOPHAGEAL REFLUX DISEASE, UNSPECIFIED WHETHER ESOPHAGITIS PRESENT: ICD-10-CM

## 2025-02-03 DIAGNOSIS — R71.8 MICROCYTOSIS: ICD-10-CM

## 2025-02-03 DIAGNOSIS — N64.4 BREAST PAIN, LEFT: Primary | ICD-10-CM

## 2025-02-03 PROCEDURE — 99214 OFFICE O/P EST MOD 30 MIN: CPT | Performed by: FAMILY MEDICINE

## 2025-02-03 NOTE — PROGRESS NOTES
/64   Pulse 76   Temp 96.5 °F (35.8 °C) (Temporal)   Resp 16   Ht 5' 5\" (1.651 m)   Wt 160 lb 2 oz (72.6 kg)   LMP  (LMP Unknown)   SpO2 98%   BMI 26.65 kg/m²               Chief Complaint   Patient presents with    Medication Follow-Up        HPI;    Jojo Yin is a 70 year old female.  For follow-up  Has been having pain on the left breast area  Last mammogram in 2018 in [place]  Patient saw a chiropractor who evaluated the pain and discomfort on the left breast area, ordered an MRI    History of hypothyroidism and gastroesophageal reflux, her last blood work showed low sodium at 130  Also has history of anemia, her last MCV in September was normal, patient is not taking any iron pills anymore      Wt Readings from Last 3 Encounters:   02/03/25 160 lb 2 oz (72.6 kg)   09/30/24 158 lb (71.7 kg)   09/16/24 159 lb (72.1 kg)     BP Readings from Last 3 Encounters:   02/03/25 110/64   09/30/24 110/52   09/16/24 122/68         ALLERGIES:  Allergies[1]  Current Outpatient Medications   Medication Sig Dispense Refill    Omeprazole 40 MG Oral Capsule Delayed Release Take 1 capsule (40 mg total) by mouth 2 (two) times daily before meals. 180 capsule 3    hydrocortisone 2.5 % External Cream Apply 1 Application topically in the morning and 1 Application before bedtime. 30 g 0    levothyroxine 100 MCG Oral Tab Take 1 tablet (100 mcg total) by mouth daily. 90 tablet 2    diclofenac 1 % External Gel Apply 2 g topically 4 (four) times daily. 100 g 2      Past Medical History:    Disorder of thyroid    Esophageal reflux    Hypothyroidism    Visual impairment    glasses      Social History:  Social History     Socioeconomic History    Marital status:    Tobacco Use    Smoking status: Never    Smokeless tobacco: Never   Vaping Use    Vaping status: Never Used   Substance and Sexual Activity    Alcohol use: Never    Drug use: Never     Social Drivers of Health      Received from [employer]     [facility]        REVIEW OF SYSTEMS:   GENERAL HEALTH: feels well otherwise  SKIN: denies any unusual skin lesions or rashes  RESPIRATORY: denies shortness of breath with exertion  CARDIOVASCULAR: denies chest pain on exertion  GI: denies abdominal pain and denies heartburn  NEURO: denies headaches  EXAM:   /64   Pulse 76   Temp 96.5 °F (35.8 °C) (Temporal)   Resp 16   Ht 5' 5\" (1.651 m)   Wt 160 lb 2 oz (72.6 kg)   LMP  (LMP Unknown)   SpO2 98%   BMI 26.65 kg/m²   GENERAL: well developed, well nourished,in no apparent distress  SKIN: no rashes,no suspicious lesions  HEENT: atraumatic, normocephalic,ears and throat are clear  NECK: supple,no adenopathy,no bruits  LUNGS: clear to auscultation  CARDIO: RRR without murmur  GI: good BS's,no masses, HSM or tenderness  EXTREMITIES: no cyanosis, clubbing or edema    ASSESSMENT AND PLAN:   Diagnoses and all orders for this visit:    Breast pain, left  Advised Tylenol, for now  Will get a mammogram  Encounter for screening mammogram for malignant neoplasm of breast  -     [facility] ROBE 2D+3D SCREENING BILAT (CPT=77067/91065); Future    Gastroesophageal reflux disease, unspecified whether esophagitis present  Continue omeprazole twice a day  If her symptoms persist referral is given to see gastroenterology  Patient had a EGD twice and it was normal  -     Gastro Referral - In Network    Hypothyroidism, unspecified type  Last TSH was therapeutic in September  Check TSH-     TSH and Free T4    Hyponatremia  Last sodium was low at 130-     Comp Metabolic Panel (14)    Microcytosis  Stopped iron pills  Will check-     CBC With Differential With Platelet        The patient indicates understanding of these issues and agrees to the plan.  Imaging & Consults:  GASTRO - INTERNAL  [facility] ROBE 2D+3D SCREENING BILAT (CPT=77067/27337)  Meds & Refills for this Visit:  Requested Prescriptions      No prescriptions requested or ordered in this encounter     Orders Placed This  Encounter   Procedures    CBC With Differential With Platelet    Comp Metabolic Panel (14)    TSH and Free T4             Tim Cee MD   [facility]  [address]  [facility] [address]    Electronically signed    This dictation was performed with a verbal recognition program (DRAGON) and it was checked for errors. It is possible that there are still dictated errors within this office note. If so, please bring any errors to my attention for an addendum. All efforts were made to ensure that this office note is accurate                   [1] No Known Allergies

## 2025-02-03 NOTE — TELEPHONE ENCOUNTER
Spoke to patient's son who's requesting an order for a Diagnostic mammogram.  Patient was notified by [facility] office that a diagnostic mammogram order is needed.

## 2025-02-12 DIAGNOSIS — E03.9 HYPOTHYROIDISM, UNSPECIFIED TYPE: Primary | ICD-10-CM

## 2025-02-12 LAB
ABSOLUTE BASOPHILS: 53 CELLS/UL (ref 0–200)
ABSOLUTE EOSINOPHILS: 410 CELLS/UL (ref 15–500)
ABSOLUTE LYMPHOCYTES: 2136 CELLS/UL (ref 850–3900)
ABSOLUTE MONOCYTES: 532 CELLS/UL (ref 200–950)
ABSOLUTE NEUTROPHILS: 4469 CELLS/UL (ref 1500–7800)
ALBUMIN/GLOBULIN RATIO: 1.2 (CALC) (ref 1–2.5)
ALBUMIN: 4.2 G/DL (ref 3.6–5.1)
ALKALINE PHOSPHATASE: 69 U/L (ref 37–153)
ALT: 12 U/L (ref 6–29)
AST: 18 U/L (ref 10–35)
BASOPHILS: 0.7 %
BILIRUBIN, TOTAL: 0.8 MG/DL (ref 0.2–1.2)
BUN: 16 MG/DL (ref 7–25)
CALCIUM: 9.8 MG/DL (ref 8.6–10.4)
CARBON DIOXIDE: 31 MMOL/L (ref 20–32)
CHLORIDE: 97 MMOL/L (ref 98–110)
CREATININE: 0.75 MG/DL (ref 0.6–1)
EGFR: 85 ML/MIN/1.73M2
EOSINOPHILS: 5.4 %
GLOBULIN: 3.6 G/DL (CALC) (ref 1.9–3.7)
GLUCOSE: 84 MG/DL (ref 65–99)
HEMATOCRIT: 47 % (ref 35–45)
HEMOGLOBIN: 14.4 G/DL (ref 11.7–15.5)
LYMPHOCYTES: 28.1 %
MCH: 24.1 PG (ref 27–33)
MCHC: 30.6 G/DL (ref 32–36)
MCV: 78.7 FL (ref 80–100)
MONOCYTES: 7 %
MPV: 9.6 FL (ref 7.5–12.5)
NEUTROPHILS: 58.8 %
PLATELET COUNT: 316 THOUSAND/UL (ref 140–400)
POTASSIUM: 4.4 MMOL/L (ref 3.5–5.3)
PROTEIN, TOTAL: 7.8 G/DL (ref 6.1–8.1)
RDW: 15.1 % (ref 11–15)
RED BLOOD CELL COUNT: 5.97 MILLION/UL (ref 3.8–5.1)
SODIUM: 134 MMOL/L (ref 135–146)
T4, FREE: 1.2 NG/DL (ref 0.8–1.8)
TSH: 7.07 MIU/L (ref 0.4–4.5)
WHITE BLOOD CELL COUNT: 7.6 THOUSAND/UL (ref 3.8–10.8)

## 2025-02-12 RX ORDER — LEVOTHYROXINE SODIUM 112 UG/1
112 TABLET ORAL DAILY
Qty: 90 TABLET | Refills: 1 | Status: SHIPPED | OUTPATIENT
Start: 2025-02-12

## 2025-02-18 ENCOUNTER — HOSPITAL ENCOUNTER (OUTPATIENT)
Dept: MAMMOGRAPHY | Facility: HOSPITAL | Age: 70
Discharge: HOME | End: 2025-02-18
Attending: FAMILY MEDICINE
Payer: MEDICAID

## 2025-02-18 DIAGNOSIS — N64.4 BREAST PAIN: ICD-10-CM

## 2025-02-18 PROCEDURE — 77062 BREAST TOMOSYNTHESIS BI: CPT | Performed by: FAMILY MEDICINE

## 2025-02-18 PROCEDURE — 76642 ULTRASOUND BREAST LIMITED: CPT | Performed by: FAMILY MEDICINE

## 2025-02-18 PROCEDURE — 77066 DX MAMMO INCL CAD BI: CPT | Performed by: FAMILY MEDICINE

## 2025-03-03 ENCOUNTER — OFFICE VISIT (OUTPATIENT)
Dept: FAMILY MEDICINE CLINIC | Facility: CLINIC | Age: 70
End: 2025-03-03
Payer: MEDICAID

## 2025-03-03 VITALS
BODY MASS INDEX: 26.82 KG/M2 | SYSTOLIC BLOOD PRESSURE: 118 MMHG | OXYGEN SATURATION: 99 % | HEART RATE: 68 BPM | RESPIRATION RATE: 18 BRPM | HEIGHT: 65 IN | DIASTOLIC BLOOD PRESSURE: 64 MMHG | WEIGHT: 161 LBS

## 2025-03-03 DIAGNOSIS — E87.1 HYPONATREMIA: ICD-10-CM

## 2025-03-03 DIAGNOSIS — R71.8 MICROCYTOSIS: ICD-10-CM

## 2025-03-03 DIAGNOSIS — E03.9 HYPOTHYROIDISM, UNSPECIFIED TYPE: Primary | ICD-10-CM

## 2025-03-03 PROCEDURE — 99214 OFFICE O/P EST MOD 30 MIN: CPT | Performed by: FAMILY MEDICINE

## 2025-03-03 RX ORDER — LEVOTHYROXINE SODIUM 112 UG/1
112 TABLET ORAL DAILY
Qty: 90 TABLET | Refills: 1 | Status: SHIPPED | OUTPATIENT
Start: 2025-03-03

## 2025-03-03 NOTE — PROGRESS NOTES
/64   Pulse 68   Resp 18   Ht 5' 5\" (1.651 m)   Wt 161 lb (73 kg)   LMP  (LMP Unknown)   SpO2 99%   BMI 26.79 kg/m²               Chief Complaint   Patient presents with    Follow - Up        HPI;    Jojo Yin is a 70 year old female.  History of Present Illness  Jojo Yin is a 70 year old female who presents for follow-up of blood test and mammogram results.    She has a slightly MCV, which had previously improved in September but has decreased again.  Reflecting her decreased iron intake  Her sodium, kidney, and liver function tests were normal.  Her TSH however was elevated.  Patient is currently taking levothyroxine 100 mcg daily  She discusses her medication, noting that when she consumes more than 100 mcg of levothyroxine her levels remain stable. However, she acknowledges that the dose may need to be increased to 112 units. She has not yet picked up the prescription sent on the 12th of the previous month.    Her mammogram was performed, and due to some irregularities, an ultrasound was also conducted.    No other problems were reported.          Wt Readings from Last 3 Encounters:   03/03/25 161 lb (73 kg)   02/03/25 160 lb 2 oz (72.6 kg)   09/30/24 158 lb (71.7 kg)     BP Readings from Last 3 Encounters:   03/03/25 118/64   02/03/25 110/64   09/30/24 110/52         ALLERGIES:  Allergies[1]  Current Outpatient Medications   Medication Sig Dispense Refill    levothyroxine 112 MCG Oral Tab Take 1 tablet (112 mcg total) by mouth daily. 90 tablet 1    Omeprazole 40 MG Oral Capsule Delayed Release Take 1 capsule (40 mg total) by mouth 2 (two) times daily before meals. 180 capsule 3    hydrocortisone 2.5 % External Cream Apply 1 Application topically in the morning and 1 Application before bedtime. 30 g 0    diclofenac 1 % External Gel Apply 2 g topically 4 (four) times daily. 100 g 2      Past Medical History:    Disorder of thyroid    Esophageal reflux    Hypothyroidism    Visual impairment     glasses      Social History:  Social History     Socioeconomic History    Marital status:    Tobacco Use    Smoking status: Never    Smokeless tobacco: Never   Vaping Use    Vaping status: Never Used   Substance and Sexual Activity    Alcohol use: Never    Drug use: Never     Social Drivers of Health      Received from [employer] [employer]    [facility]        REVIEW OF SYSTEMS:   GENERAL HEALTH: feels well otherwise  SKIN: denies any unusual skin lesions or rashes  RESPIRATORY: denies shortness of breath with exertion  CARDIOVASCULAR: denies chest pain on exertion  GI: denies abdominal pain and denies heartburn  NEURO: denies headaches  EXAM:   /64   Pulse 68   Resp 18   Ht 5' 5\" (1.651 m)   Wt 161 lb (73 kg)   LMP  (LMP Unknown)   SpO2 99%   BMI 26.79 kg/m²   GENERAL: well developed, well nourished,in no apparent distress  Not examined    ASSESSMENT AND PLAN:     Assessment & Plan  Microcytosis  Iron Deficiency  Patient to restart iron sulfate  - Send prescription to pharmacy.    Breast Screening Follow-up  Mammogram and ultrasound showed irregular findings. Repeat screening in one year to monitor changes.  - Schedule follow-up mammogram and ultrasound in one year.    Hypothyroidism, unspecified type  Levothyroxine increased to 112 mcg  Prescription sent to the pharmacy  Hyponatremia  Improved at 134      Other orders  -     levothyroxine 112 MCG Oral Tab; Take 1 tablet (112 mcg total) by mouth daily.  Time spent at appointment today is 30 minutes including preparing to see patient, reviewing test results, performing medically appropriate examination and evaluation and coordinating care, counseling and educating patient/family, ordering medications and testing, and documenting clinical information in EMR.       The patient indicates understanding of these issues and agrees to the plan.  Imaging & Consults:  None  Meds & Refills for this Visit:  Requested Prescriptions     Signed  Prescriptions Disp Refills    levothyroxine 112 MCG Oral Tab 90 tablet 1     Sig: Take 1 tablet (112 mcg total) by mouth daily.     No orders of the defined types were placed in this encounter.            Tim Cee MD   [facility]  [address] [address]    Electronically signed    This dictation was performed with a verbal recognition program (DRAGON) and it was checked for errors. It is possible that there are still dictated errors within this office note. If so, please bring any errors to my attention for an addendum. All efforts were made to ensure that this office note is accurate                   [1] No Known Allergies

## 2025-05-07 RX ORDER — LEVOTHYROXINE SODIUM 112 UG/1
112 TABLET ORAL DAILY
Qty: 90 TABLET | Refills: 0 | Status: SHIPPED | OUTPATIENT
Start: 2025-05-07 | End: 2025-05-12

## 2025-05-07 NOTE — TELEPHONE ENCOUNTER
levothyroxine 112 MCG Oral Tab 90 tablet 1 3/3/2025 --    Sig - Route: Take 1 tablet (112 mcg total) by mouth daily. - Oral    Sent to pharmacy as: Levothyroxine Sodium 112 MCG Oral Tablet (Synthroid)    E-Prescribing Status: Receipt confirmed by pharmacy (3/3/2025  5:30 PM CST)      Pharmacy    [facility] [address] [phone], [phone]   Transfer   03/03/2025  LAST WRITTEN: 03/03/2025  Quantity: 90   Transferred 90 with 0 refills     levothyroxine 112 MCG Oral Tab 90 tablet 0 5/7/2025 --    Sig - Route: TAKE 1 TABLET BY MOUTH ONCE DAILY - Oral    Sent to pharmacy as: Levothyroxine Sodium 112 MCG Oral Tablet (Synthroid)    E-Prescribing Status: Transmission to pharmacy in progress (5/7/2025  9:33 AM CDT)      Pharmacy    [facility] - [place], [place] - [facility] [address] [phone], [phone]

## 2025-05-12 ENCOUNTER — OFFICE VISIT (OUTPATIENT)
Dept: FAMILY MEDICINE CLINIC | Facility: CLINIC | Age: 70
End: 2025-05-12
Payer: MEDICAID

## 2025-05-12 VITALS
HEIGHT: 65 IN | DIASTOLIC BLOOD PRESSURE: 70 MMHG | SYSTOLIC BLOOD PRESSURE: 110 MMHG | TEMPERATURE: 97 F | BODY MASS INDEX: 26.41 KG/M2 | OXYGEN SATURATION: 98 % | WEIGHT: 158.5 LBS | RESPIRATION RATE: 16 BRPM | HEART RATE: 80 BPM

## 2025-05-12 DIAGNOSIS — L81.9 HYPOPIGMENTATION: ICD-10-CM

## 2025-05-12 DIAGNOSIS — E03.9 HYPOTHYROIDISM, UNSPECIFIED TYPE: ICD-10-CM

## 2025-05-12 DIAGNOSIS — Z00.00 ENCOUNTER FOR ANNUAL PHYSICAL EXAM: Primary | ICD-10-CM

## 2025-05-12 PROBLEM — R19.7 DIARRHEA: Status: ACTIVE | Noted: 2021-10-10

## 2025-05-12 RX ORDER — LEVOTHYROXINE SODIUM 112 UG/1
112 TABLET ORAL DAILY
Qty: 90 TABLET | Refills: 0 | Status: SHIPPED | OUTPATIENT
Start: 2025-05-12

## 2025-05-12 NOTE — PROGRESS NOTES
/70   Pulse 80   Temp 96.9 °F (36.1 °C) (Temporal)   Resp 16   Ht 5' 5\" (1.651 m)   Wt 158 lb 8 oz (71.9 kg)   LMP  (LMP Unknown)   SpO2 98%   BMI 26.38 kg/m²  Body mass index is 26.38 kg/m².     Chief Complaint   Patient presents with    Thyroid Problem     The following individual(s) verbally consented to be recorded using ambient AI listening technology and understand that they can each withdraw their consent to this listening technology at any point by asking the clinician to turn off or pause the recording:    Patient name: Jojo Yin   Additional names: Chelsey Tatianadave            Jojo Yin is a 70 year old female who presents for a complete physical exam.   HPI:     History of Present Illness  Jojo Yin is a 70 year old female who presents with skin discoloration and tongue issues.    She has a white patch on her skin, though the specific location and size are not detailed. There is concern about this discoloration, but no additional symptoms or changes have been noted.    She describes an issue with her tongue, noting something occurring 'inside the tongue' without associated pain or other symptoms.    Her current medications include Synthroid 112 mcg and a multivitamin. Her thyroid levels were normal at the last check.        Wt Readings from Last 4 Encounters:   05/12/25 158 lb 8 oz (71.9 kg)   03/03/25 161 lb (73 kg)   02/03/25 160 lb 2 oz (72.6 kg)   09/30/24 158 lb (71.7 kg)     Body mass index is 26.38 kg/m².   BP Readings from Last 3 Encounters:   05/12/25 110/70   03/03/25 118/64   02/03/25 110/64      Current Medications[1]   Past Medical History[2]   Past Surgical History[3]   Family History[4]   Social History:  Social Hx on file[5]   Exercise: none.  Diet: doesn't watch     REVIEW OF SYSTEMS:   GENERAL HEALTH: feels well otherwise, denies fever  SKIN: denies any unusual skin lesions or rashes  EYES: no visual complaints or deficits  HEENT: denies nasal congestion, sinus pain or  sore throat; hearing loss negative  RESPIRATORY: denies shortness of breath, wheezing or cough  CARDIOVASCULAR: denies chest pain or MO; no palpitations  GI: denies nausea, vomiting, constipation, diarrhea; no rectal bleeding; no heartburn  MUSCULOSKELETAL: no joint complaints upper or lower extremities  NEURO: no sensory or motor complaint  PSYCHE: no symptoms of depression or anxiety  HEMATOLOGY: denies h/o anemia; denies bruising or excessive bleeding  ENDOCRINE: denies excessive thirst or urination; denies unexpected wt gain or wt loss  ALLERGY/IMM.: denies food or seasonal allergies    EXAM:   /70   Pulse 80   Temp 96.9 °F (36.1 °C) (Temporal)   Resp 16   Ht 5' 5\" (1.651 m)   Wt 158 lb 8 oz (71.9 kg)   LMP  (LMP Unknown)   SpO2 98%   BMI 26.38 kg/m²  Body mass index is 26.38 kg/m².   GENERAL: well developed, well nourished,in no apparent distress  SKIN: Hypopigmentation on the right anterior chest wall  HEENT: atraumatic, normocephalic,ears and throat are clear  EYES:PERRLA, EOMI,conjunctiva are clear  NECK: supple,no adenopathy,no bruits  CHEST: no chest tenderness  LUNGS: clear to auscultation  CARDIO: RRR without murmur  GI: good BS's,no masses, HSM or tenderness  : Deferred  RECTAL:Deferred  MUSCULOSKELETAL: back is not tender,FROM of the back  EXTREMITIES: no cyanosis, clubbing or edema  NEURO: Oriented times three,cranial nerves are intact,motor and sensory are grossly intact    ASSESSMENT AND PLAN:   :Jojo was seen today for thyroid problem.    Diagnoses and all orders for this visit:    Encounter for annual physical exam  Jojo Yin is a 70 year old female who presents for a complete physical exam.   Pt's weight is Body mass index is 26.38 kg/m².,   Eat a heart-healthy diet. Include potassium and fiber, and drink plenty of water.   Patient is advised to lose weight,   Exercise regularly --- Dietary measures discussed include diet about 1800 calories - Excercise regimen also reviewed  as well as long term benefits on overall health.   Recommend at least 30 minutes a day 3-4 times a week of aerobic activity such as brisk walking, cycling, aerobics, or swimming.  Anerobic activities also encouraged for overall toning and strength/endurance building    Labs were ordered in the past unfortunately she did not complete it yet, she will come back from [facility] [address] and get them done next month at [facility]    Hypothyroidism, unspecified type  Continue levothyroxine 112  I am reordering her TSH T4-     TSH and Free T4    Hypopigmentation  Patch of hypopigmentation on the right anterior chest wall just above the clavicle  Differential diagnosis includes hypothyroidism versus vitiligo  Will send her to dermatology in network after she returns from [facility] [address]     CBC With Differential With Platelet  -     Comp Metabolic Panel (14)    Other orders  -     levothyroxine 112 MCG Oral Tab; Take 1 tablet (112 mcg total) by mouth daily.        The patient indicates understanding of these issues and agrees to the plan.  .          No follow-ups on file.        Tim Cee MD, 5/12/2025, 5:28 PM      This dictation was performed with a verbal recognition program (DRAGON) and it was checked for errors. It is possible that there are still dictated errors within this office note. If so, please bring any errors to my attention for an addendum. All efforts were made to ensure that this office note is accurate           [1]   Current Outpatient Medications   Medication Sig Dispense Refill    levothyroxine 112 MCG Oral Tab Take 1 tablet (112 mcg total) by mouth daily. 90 tablet 0    Omeprazole 40 MG Oral Capsule Delayed Release Take 1 capsule (40 mg total) by mouth 2 (two) times daily before meals. 180 capsule 3    hydrocortisone 2.5 % External Cream Apply 1 Application topically in the morning and 1 Application before bedtime. (Patient not taking: Reported on 5/12/2025) 30 g 0    diclofenac 1 % External Gel Apply 2 g topically  4 (four) times daily. 100 g 2   [2]   Past Medical History:   Disorder of thyroid    Esophageal reflux    Hypothyroidism    Visual impairment    glasses   [3]   Past Surgical History:  Procedure Laterality Date    Cholecystectomy      Colonoscopy N/A 11/9/2023    Procedure: COLONOSCOPY;  Surgeon: Virginia Galindo MD;  Location:  ENDOSCOPY    Hysterectomy      Removal gallbladder      Total abdom hysterectomy     [4]   Family History  Family history unknown: Yes   [5]   Social History  Socioeconomic History    Marital status:    Tobacco Use    Smoking status: Never    Smokeless tobacco: Never   Vaping Use    Vaping status: Never Used   Substance and Sexual Activity    Alcohol use: Never    Drug use: Never

## 2025-05-20 ENCOUNTER — NURSE TRIAGE (OUTPATIENT)
Dept: FAMILY MEDICINE CLINIC | Facility: CLINIC | Age: 70
End: 2025-05-20

## 2025-05-20 DIAGNOSIS — R35.0 URINARY FREQUENCY: Primary | ICD-10-CM

## 2025-05-20 NOTE — TELEPHONE ENCOUNTER
Action Requested: Summary for Provider     []  Critical Lab, Recommendations Needed  [x] Need Additional Advice  []   FYI    [x]   Need Orders  [] Need Medications Sent to Pharmacy  []  Other     SUMMARY: Patient son Omar (ok per HIPAA) called regarding that patient is going to lab tomorrow and would like to add on a urine test as well. States that patient has been having urinary frequency for 1 month. Denies burning with urination, foul odor, fever, flank pain, nausea/vomiting, abdominal pain. Denies excessive thirst. Informed that she would need to be seen due to not discussing this symptom at visit on 5/12/25. Informed that patient forgot to mention. Son states that he would like a urine order instead of having to take patient to office again.     , patient son requesting urine order for patient due to urinary frequency for 1 month. Denies any other symptom. States forgot to mention at visit on 5/12/25.    Reason for call: Urinary Symptoms  Onset:1 month ago                     Reason for Disposition   Urinating more frequently than usual (i.e., frequency)    Protocols used: Urinary Symptoms-A-OH

## 2025-05-28 LAB
ABSOLUTE BASOPHILS: 42 CELLS/UL (ref 0–200)
ABSOLUTE EOSINOPHILS: 468 CELLS/UL (ref 15–500)
ABSOLUTE LYMPHOCYTES: 2304 CELLS/UL (ref 850–3900)
ABSOLUTE MONOCYTES: 594 CELLS/UL (ref 200–950)
ABSOLUTE NEUTROPHILS: 2592 CELLS/UL (ref 1500–7800)
ALBUMIN/GLOBULIN RATIO: 1.3 (CALC) (ref 1–2.5)
ALBUMIN: 4 G/DL (ref 3.6–5.1)
ALKALINE PHOSPHATASE: 71 U/L (ref 37–153)
ALT: 13 U/L (ref 6–29)
AST: 17 U/L (ref 10–35)
BASOPHILS: 0.7 %
BILIRUBIN, TOTAL: 0.6 MG/DL (ref 0.2–1.2)
BUN: 11 MG/DL (ref 7–25)
CALCIUM: 9.4 MG/DL (ref 8.6–10.4)
CARBON DIOXIDE: 27 MMOL/L (ref 20–32)
CHLORIDE: 100 MMOL/L (ref 98–110)
CREATININE: 0.65 MG/DL (ref 0.6–1)
EGFR: 94 ML/MIN/1.73M2
EOSINOPHILS: 7.8 %
GLOBULIN: 3.2 G/DL (CALC) (ref 1.9–3.7)
GLUCOSE: 86 MG/DL (ref 65–99)
HEMATOCRIT: 43.7 % (ref 35–45)
HEMOGLOBIN: 13.3 G/DL (ref 11.7–15.5)
LYMPHOCYTES: 38.4 %
MCH: 24.5 PG (ref 27–33)
MCHC: 30.4 G/DL (ref 32–36)
MCV: 80.5 FL (ref 80–100)
MONOCYTES: 9.9 %
MPV: 9.2 FL (ref 7.5–12.5)
NEUTROPHILS: 43.2 %
PLATELET COUNT: 284 THOUSAND/UL (ref 140–400)
POTASSIUM: 3.9 MMOL/L (ref 3.5–5.3)
PROTEIN, TOTAL: 7.2 G/DL (ref 6.1–8.1)
RDW: 13.4 % (ref 11–15)
RED BLOOD CELL COUNT: 5.43 MILLION/UL (ref 3.8–5.1)
SODIUM: 135 MMOL/L (ref 135–146)
T4, FREE: 1.5 NG/DL (ref 0.8–1.8)
TSH: 0.18 MIU/L (ref 0.4–4.5)
WHITE BLOOD CELL COUNT: 6 THOUSAND/UL (ref 3.8–10.8)

## 2025-07-15 ENCOUNTER — HOSPITAL ENCOUNTER (EMERGENCY)
Age: 70
Discharge: HOME | End: 2025-07-15
Attending: EMERGENCY MEDICINE
Payer: MEDICAID

## 2025-07-15 VITALS
HEART RATE: 75 BPM | OXYGEN SATURATION: 97 % | SYSTOLIC BLOOD PRESSURE: 111 MMHG | BODY MASS INDEX: 25.83 KG/M2 | RESPIRATION RATE: 18 BRPM | HEIGHT: 65 IN | WEIGHT: 155 LBS | TEMPERATURE: 98 F | DIASTOLIC BLOOD PRESSURE: 74 MMHG

## 2025-07-15 DIAGNOSIS — R53.83 OTHER FATIGUE: Primary | ICD-10-CM

## 2025-07-15 LAB
ALBUMIN SERPL-MCNC: 4.1 G/DL (ref 3.2–4.8)
ALBUMIN/GLOB SERPL: 1.4 {RATIO} (ref 1–2)
ALP LIVER SERPL-CCNC: 66 U/L (ref 55–142)
ALT SERPL-CCNC: 14 U/L (ref 10–49)
ANION GAP SERPL CALC-SCNC: 5 MMOL/L (ref 0–18)
AST SERPL-CCNC: 17 U/L (ref ?–34)
BASOPHILS # BLD AUTO: 0.03 X10(3) UL (ref 0–0.2)
BASOPHILS NFR BLD AUTO: 0.5 %
BILIRUB SERPL-MCNC: 0.4 MG/DL (ref 0.2–1.1)
BUN BLD-MCNC: 13 MG/DL (ref 9–23)
CALCIUM BLD-MCNC: 9.4 MG/DL (ref 8.7–10.6)
CHLORIDE SERPL-SCNC: 103 MMOL/L (ref 98–112)
CO2 SERPL-SCNC: 29 MMOL/L (ref 21–32)
CREAT BLD-MCNC: 0.86 MG/DL (ref 0.55–1.02)
EGFRCR SERPLBLD CKD-EPI 2021: 72 ML/MIN/1.73M2 (ref 60–?)
EOSINOPHIL # BLD AUTO: 0.35 X10(3) UL (ref 0–0.7)
EOSINOPHIL NFR BLD AUTO: 5.5 %
ERYTHROCYTE [DISTWIDTH] IN BLOOD BY AUTOMATED COUNT: 16.1 %
GLOBULIN PLAS-MCNC: 2.9 G/DL (ref 2–3.5)
GLUCOSE BLD-MCNC: 98 MG/DL (ref 70–99)
HCT VFR BLD AUTO: 39.5 % (ref 35–48)
HGB BLD-MCNC: 12.8 G/DL (ref 12–16)
IMM GRANULOCYTES # BLD AUTO: 0.02 X10(3) UL (ref 0–1)
IMM GRANULOCYTES NFR BLD: 0.3 %
LYMPHOCYTES # BLD AUTO: 2.38 X10(3) UL (ref 1–4)
LYMPHOCYTES NFR BLD AUTO: 37.6 %
MCH RBC QN AUTO: 25.2 PG (ref 26–34)
MCHC RBC AUTO-ENTMCNC: 32.4 G/DL (ref 31–37)
MCV RBC AUTO: 77.8 FL (ref 80–100)
MONOCYTES # BLD AUTO: 0.72 X10(3) UL (ref 0.1–1)
MONOCYTES NFR BLD AUTO: 11.4 %
NEUTROPHILS # BLD AUTO: 2.83 X10 (3) UL (ref 1.5–7.7)
NEUTROPHILS # BLD AUTO: 2.83 X10(3) UL (ref 1.5–7.7)
NEUTROPHILS NFR BLD AUTO: 44.7 %
OSMOLALITY SERPL CALC.SUM OF ELEC: 284 MOSM/KG (ref 275–295)
PLATELET # BLD AUTO: 244 10(3)UL (ref 150–450)
POTASSIUM SERPL-SCNC: 4.4 MMOL/L (ref 3.5–5.1)
PROT SERPL-MCNC: 7 G/DL (ref 5.7–8.2)
RBC # BLD AUTO: 5.08 X10(6)UL (ref 3.8–5.3)
SODIUM SERPL-SCNC: 137 MMOL/L (ref 136–145)
WBC # BLD AUTO: 6.3 X10(3) UL (ref 4–11)

## 2025-07-15 PROCEDURE — 36415 COLL VENOUS BLD VENIPUNCTURE: CPT

## 2025-07-15 PROCEDURE — 85025 COMPLETE CBC W/AUTO DIFF WBC: CPT | Performed by: EMERGENCY MEDICINE

## 2025-07-15 PROCEDURE — 80053 COMPREHEN METABOLIC PANEL: CPT | Performed by: EMERGENCY MEDICINE

## 2025-07-15 PROCEDURE — 93010 ELECTROCARDIOGRAM REPORT: CPT

## 2025-07-15 PROCEDURE — 99283 EMERGENCY DEPT VISIT LOW MDM: CPT

## 2025-07-15 PROCEDURE — 93005 ELECTROCARDIOGRAM TRACING: CPT

## 2025-07-16 LAB
ATRIAL RATE: 76 BPM
P AXIS: 59 DEGREES
P-R INTERVAL: 164 MS
Q-T INTERVAL: 366 MS
QRS DURATION: 78 MS
QTC CALCULATION (BEZET): 411 MS
R AXIS: 24 DEGREES
T AXIS: 47 DEGREES
VENTRICULAR RATE: 76 BPM

## 2025-07-16 NOTE — ED PROVIDER NOTES
Patient Seen in: [facility] In [place]        History  Chief Complaint   Patient presents with    Fatigue     Stated Complaint: weakness for a couple weeks    Subjective:   HPI            70-year-old female past medical history as below presents with generalized fatigue.  States this started several weeks ago.  She thought she had food poisoning and had several days of nausea and vomiting and diarrhea which has since resolved.  Since then she has felt generally weak with activity.  No shortness of breath.  No chest pain.  No fevers.  No abdominal pain.  No melena or hematochezia.  No urinary symptoms.  No focal weakness or numbness or tingling.  No lightheadedness or presyncopal symptoms      Objective:     Past Medical History:    Disorder of thyroid    Esophageal reflux    Hypothyroidism    Visual impairment    glasses              Past Surgical History:   Procedure Laterality Date    Cholecystectomy      Colonoscopy N/A 11/9/2023    Procedure: COLONOSCOPY;  Surgeon: Virginia Galindo MD;  Location:  ENDOSCOPY    Hysterectomy      Removal gallbladder      Total abdom hysterectomy                  Social History     Socioeconomic History    Marital status:    Tobacco Use    Smoking status: Never    Smokeless tobacco: Never   Vaping Use    Vaping status: Never Used   Substance and Sexual Activity    Alcohol use: Never    Drug use: Never     Social Drivers of Health      [facility]                                Physical Exam    ED Triage Vitals [07/15/25 1932]   /74   Pulse 75   Resp 18   Temp 98.3 °F (36.8 °C)   Temp src Oral   SpO2 97 %   O2 Device None (Room air)       Current Vitals:   Vital Signs  BP: 111/74  Pulse: 75  Resp: 18  Temp: 98.3 °F (36.8 °C)  Temp src: Oral    Oxygen Therapy  SpO2: 97 %  O2 Device: None (Room air)            Physical Exam  Constitutional:       General: is not in acute distress.     Appearance: is not  ill-appearing.   HENT:      Head: Normocephalic and atraumatic.      Nose: Nose normal.      Mouth/Throat:      Mouth: Mucous membranes are moist.   Eyes:      Conjunctiva/sclera: Conjunctivae normal.      Pupils: Pupils are equal, round, and reactive to light.   Cardiovascular:      Rate and Rhythm: Normal rate and regular rhythm.   Pulmonary:      Effort: Pulmonary effort is normal. No respiratory distress.   Abdominal:      General: Abdomen is flat. There is no distension. Nontender throghout  Musculoskeletal:      Cervical back: Neck supple.      Right lower leg: No edema.      Left lower leg: No edema.   NEUROLOGICAL:   Mental status: Oriented to person, place, and time  Speech & Language: Fluent, no dysarthria  Comprehension: Intact  Cranial Nerves: VFF, PERRL, EOMI, no nystagmus, facial sensation intact, face symmetric, tongue midline, shoulder shrug equal  Motor: tone, bulk, strength are normal in all flexors and extensors   Sensory: Intact to light touch in all limbs  Coordination: FNF intact  Gait: Deferred                 ED Course  Labs Reviewed   CBC WITH DIFFERENTIAL WITH PLATELET - Abnormal; Notable for the following components:       Result Value    MCV 77.8 (*)     MCH 25.2 (*)     All other components within normal limits   COMP METABOLIC PANEL (14) - Normal     EKG    Rate, intervals and axes as noted on EKG Report.  Rate: 76  Rhythm: Normal sinus rhythm  Reading: No ST elevation or depression                                MDM     Considered all emergent etiologies, differential includes but is not limited to: Dehydration, electrolyte abnormality, anemia     I have independently visualized the radiology images, my focus/limited interpretation: N/A     Defer to radiologist for other/incidental findings    Labs unremarkable.  Nonfocal examination.  Patient requesting GI referral as well as dermatology referral which were provided.  Discussed red flags and return precautions as well as differential  and advised follow-up with PCP for further evaluation        Medical Decision Making      Disposition and Plan     Clinical Impression:  1. Other fatigue         Disposition:  Discharge  7/15/2025  8:47 pm    Follow-up:  Tim Cee MD  [address]  [facility] [address] [phone]    Follow up      [facility]  [address] [facility] [address]  Follow up      [facility]OLOGY & DERMATOLOGIC SURGERY [address] [address] [phone]  Follow up            Medications Prescribed:  Current Discharge Medication List                Supplementary Documentation:

## 2025-07-16 NOTE — ED INITIAL ASSESSMENT (HPI)
Fatigue for the past few weeks. Just returned to the [place] from [place] two days ago. Was seen in an ER in [place] for the same. Also reports neck is turning black.

## 2025-07-24 ENCOUNTER — OFFICE VISIT (OUTPATIENT)
Dept: FAMILY MEDICINE CLINIC | Facility: CLINIC | Age: 70
End: 2025-07-24
Payer: MEDICAID

## 2025-07-24 VITALS
HEART RATE: 80 BPM | HEIGHT: 65 IN | DIASTOLIC BLOOD PRESSURE: 64 MMHG | WEIGHT: 155 LBS | OXYGEN SATURATION: 96 % | RESPIRATION RATE: 16 BRPM | TEMPERATURE: 97 F | BODY MASS INDEX: 25.83 KG/M2 | SYSTOLIC BLOOD PRESSURE: 114 MMHG

## 2025-07-24 DIAGNOSIS — R53.1 GENERALIZED WEAKNESS: Primary | ICD-10-CM

## 2025-07-24 DIAGNOSIS — E03.9 HYPOTHYROIDISM, UNSPECIFIED TYPE: ICD-10-CM

## 2025-07-24 PROCEDURE — 99214 OFFICE O/P EST MOD 30 MIN: CPT | Performed by: FAMILY MEDICINE

## 2025-07-24 NOTE — PROGRESS NOTES
Subjective:   Jojo Yin is a 70 year old female who presents for Follow - Up (ER visit on 7/15. F/u on recent labs//The following individual(s) verbally consented to be recorded using ambient AI listening technology and understand that they can each withdraw their consent to this listening technology at any point by asking the clinician to turn off or pause the recording://Patient name: Jojo Yin/Additional name: Pipo Barbosa/ ) and Leg Pain (Soreness in R leg)   History/Other:   History of Present Illness  Jojo Yin is a 70 year old female who presents with weakness after returning from [place].    She experienced significant weakness after returning from a trip to [place], severe enough to prompt a visit to the emergency department during her stay there. She is unsure if any medication given in [place] contributed to her current condition. No injections were received that might have provided strength.    She is currently taking vitamin B12 supplements, which she believes help with her symptoms. She denies experiencing pain in her legs but mentions a sensation of numbness that improves with the B12 supplements.    She drinks Gatorade and finds it beneficial. She also drinks water and occasionally uses Pedialyte.   Chief Complaint Reviewed and Verified  Nursing Notes Reviewed and   Verified  Tobacco Reviewed  Allergies Reviewed  Medications Reviewed    Problem List Reviewed  Medical History Reviewed  Surgical History   Reviewed  OB Status Reviewed  Family History Reviewed  Social History   Reviewed         Tobacco:  She has never smoked tobacco.    Current Medications[1]           Review of Systems:  Pertinent items are noted in HPI.      Objective:   /64   Pulse 80   Temp 97 °F (36.1 °C) (Temporal)   Resp 16   Ht 5' 5\" (1.651 m)   Wt 155 lb (70.3 kg)   LMP  (LMP Unknown)   SpO2 96%   BMI 25.79 kg/m²  Estimated body mass index is 25.79 kg/m² as calculated from the following:     Height as of this encounter: 5' 5\" (1.651 m).    Weight as of this encounter: 155 lb (70.3 kg).  Results       Physical Exam  Alert awake oriented x 3  Chest clear to auscultation  Heart-regular rate rhythm  Abdomen soft nontender nondistended  Extremities: No pedal edema cyanosis or clubbing        Assessment & Plan:   1. Generalized weakness (Primary)  2. Hypothyroidism, unspecified type  Weakness  She reports significant weakness after returning from a trip to [facility], severe enough to require an emergency department visit. Dehydration is suspected as a potential cause, especially given her improvement after consuming Gatorade, suggesting electrolyte imbalance.  - Encourage increased fluid intake, including water and electrolyte solutions like Gatorade or Pedialyte, to address potential dehydration.  Continue the current dosage of the thyroxine  She is taking vitamin B12 supplements and reports improvement in symptoms such as numbness in the legs, indicating management of vitamin B12 deficiency with supplementation.  Blood work from [facility] reviewed as well as blood test from emergency room reviewed  We will recheck her labs in 3 months  -     Comp Metabolic Panel (14)  -     TSH W Reflex To Free T4  -     CBC, Platelet; No Differential          No follow-ups on file.        Tim Cee MD, 7/24/2025, 4:25 PM              [1]   Current Outpatient Medications   Medication Sig Dispense Refill    levothyroxine 112 MCG Oral Tab Take 1 tablet (112 mcg total) by mouth daily. 90 tablet 0    Omeprazole 40 MG Oral Capsule Delayed Release Take 1 capsule (40 mg total) by mouth 2 (two) times daily before meals. 180 capsule 3    diclofenac 1 % External Gel Apply 2 g topically 4 (four) times daily. 100 g 2    prednisoLONE 1 % Ophthalmic Suspension INSTILL 1 DROP IN SURGICAL EYE 4 TIMES DAILY FOR 4 DAYS AFTER PROCEDURE. (Patient not taking: Reported on 7/24/2025)      hydrocortisone 2.5 % External Cream Apply 1 Application  topically in the morning and 1 Application before bedtime. (Patient not taking: Reported on 5/12/2025) 30 g 0      What Type Of Note Output Would You Prefer (Optional)?: Bullet Format How Severe Are Your Spot(S)?: moderate Have Your Spot(S) Been Treated In The Past?: has not been treated Hpi Title: Evaluation of a Skin Lesion

## 2025-07-28 ENCOUNTER — TELEPHONE (OUTPATIENT)
Dept: ORTHOPEDICS CLINIC | Facility: CLINIC | Age: 70
End: 2025-07-28

## 2025-07-28 ENCOUNTER — HOSPITAL ENCOUNTER (EMERGENCY)
Age: 70
Discharge: HOME | End: 2025-07-28
Attending: EMERGENCY MEDICINE

## 2025-07-28 ENCOUNTER — APPOINTMENT (OUTPATIENT)
Dept: GENERAL RADIOLOGY | Age: 70
End: 2025-07-28
Attending: EMERGENCY MEDICINE

## 2025-07-28 VITALS
RESPIRATION RATE: 18 BRPM | DIASTOLIC BLOOD PRESSURE: 69 MMHG | OXYGEN SATURATION: 100 % | SYSTOLIC BLOOD PRESSURE: 122 MMHG | TEMPERATURE: 98 F | HEART RATE: 75 BPM

## 2025-07-28 DIAGNOSIS — S62.317A CLOSED DISPLACED FRACTURE OF BASE OF FIFTH METACARPAL BONE OF LEFT HAND, INITIAL ENCOUNTER: ICD-10-CM

## 2025-07-28 DIAGNOSIS — S62.345A CLOSED NONDISPLACED FRACTURE OF BASE OF FOURTH METACARPAL BONE OF LEFT HAND, INITIAL ENCOUNTER: Primary | ICD-10-CM

## 2025-07-28 PROCEDURE — 73130 X-RAY EXAM OF HAND: CPT | Performed by: EMERGENCY MEDICINE

## 2025-07-28 PROCEDURE — 29125 APPL SHORT ARM SPLINT STATIC: CPT

## 2025-07-28 PROCEDURE — 99284 EMERGENCY DEPT VISIT MOD MDM: CPT

## 2025-07-28 PROCEDURE — 73110 X-RAY EXAM OF WRIST: CPT | Performed by: EMERGENCY MEDICINE

## 2025-07-30 ENCOUNTER — HOSPITAL ENCOUNTER (OUTPATIENT)
Dept: GENERAL RADIOLOGY | Age: 70
Discharge: HOME | End: 2025-07-30
Attending: ORTHOPAEDIC SURGERY

## 2025-07-30 ENCOUNTER — OFFICE VISIT (OUTPATIENT)
Dept: ORTHOPEDICS CLINIC | Facility: CLINIC | Age: 70
End: 2025-07-30
Payer: MEDICAID

## 2025-07-30 ENCOUNTER — TELEPHONE (OUTPATIENT)
Dept: ORTHOPEDICS CLINIC | Facility: CLINIC | Age: 70
End: 2025-07-30

## 2025-07-30 VITALS — BODY MASS INDEX: 25.83 KG/M2 | WEIGHT: 155 LBS | HEIGHT: 65 IN

## 2025-07-30 DIAGNOSIS — M79.642 LEFT HAND PAIN: ICD-10-CM

## 2025-07-30 DIAGNOSIS — S62.317A CLOSED DISPLACED FRACTURE OF BASE OF FIFTH METACARPAL BONE OF LEFT HAND, INITIAL ENCOUNTER: Primary | ICD-10-CM

## 2025-07-30 DIAGNOSIS — M79.642 LEFT HAND PAIN: Primary | ICD-10-CM

## 2025-07-30 DIAGNOSIS — M79.641 RIGHT HAND PAIN: ICD-10-CM

## 2025-07-30 PROCEDURE — 73130 X-RAY EXAM OF HAND: CPT | Performed by: ORTHOPAEDIC SURGERY

## 2025-07-30 PROCEDURE — 99204 OFFICE O/P NEW MOD 45 MIN: CPT | Performed by: ORTHOPAEDIC SURGERY

## 2025-07-31 ENCOUNTER — HOSPITAL ENCOUNTER (OUTPATIENT)
Dept: CT IMAGING | Age: 70
Discharge: HOME | End: 2025-07-31
Attending: ORTHOPAEDIC SURGERY

## 2025-07-31 DIAGNOSIS — S62.317A CLOSED DISPLACED FRACTURE OF BASE OF FIFTH METACARPAL BONE OF LEFT HAND, INITIAL ENCOUNTER: ICD-10-CM

## 2025-07-31 PROCEDURE — 73200 CT UPPER EXTREMITY W/O DYE: CPT | Performed by: ORTHOPAEDIC SURGERY

## 2025-08-11 ENCOUNTER — TELEPHONE (OUTPATIENT)
Dept: ORTHOPEDICS CLINIC | Facility: CLINIC | Age: 70
End: 2025-08-11

## 2025-08-11 ENCOUNTER — OFFICE VISIT (OUTPATIENT)
Dept: FAMILY MEDICINE CLINIC | Facility: CLINIC | Age: 70
End: 2025-08-11

## 2025-08-11 VITALS
WEIGHT: 157 LBS | HEIGHT: 65 IN | TEMPERATURE: 97 F | SYSTOLIC BLOOD PRESSURE: 114 MMHG | BODY MASS INDEX: 26.16 KG/M2 | RESPIRATION RATE: 16 BRPM | OXYGEN SATURATION: 98 % | DIASTOLIC BLOOD PRESSURE: 66 MMHG | HEART RATE: 61 BPM

## 2025-08-11 DIAGNOSIS — S62.393A CLOSED NONDISPLACED FRACTURE OF OTHER PART OF THIRD METACARPAL BONE OF LEFT HAND, INITIAL ENCOUNTER: Primary | ICD-10-CM

## 2025-08-11 DIAGNOSIS — M54.16 LUMBAR RADICULOPATHY: ICD-10-CM

## 2025-08-11 DIAGNOSIS — S62.317A CLOSED DISPLACED FRACTURE OF BASE OF FIFTH METACARPAL BONE OF LEFT HAND, INITIAL ENCOUNTER: Primary | ICD-10-CM

## 2025-08-11 PROCEDURE — 99214 OFFICE O/P EST MOD 30 MIN: CPT | Performed by: FAMILY MEDICINE

## 2025-08-13 ENCOUNTER — OFFICE VISIT (OUTPATIENT)
Dept: ORTHOPEDICS CLINIC | Facility: CLINIC | Age: 70
End: 2025-08-13

## 2025-08-13 ENCOUNTER — HOSPITAL ENCOUNTER (OUTPATIENT)
Dept: GENERAL RADIOLOGY | Age: 70
Discharge: HOME | End: 2025-08-13
Attending: ORTHOPAEDIC SURGERY

## 2025-08-13 VITALS — WEIGHT: 157 LBS | BODY MASS INDEX: 26.16 KG/M2 | HEIGHT: 65 IN

## 2025-08-13 DIAGNOSIS — S62.317A CLOSED DISPLACED FRACTURE OF BASE OF FIFTH METACARPAL BONE OF LEFT HAND, INITIAL ENCOUNTER: Primary | ICD-10-CM

## 2025-08-13 DIAGNOSIS — S62.317A CLOSED DISPLACED FRACTURE OF BASE OF FIFTH METACARPAL BONE OF LEFT HAND, INITIAL ENCOUNTER: ICD-10-CM

## 2025-08-13 PROCEDURE — 99213 OFFICE O/P EST LOW 20 MIN: CPT | Performed by: ORTHOPAEDIC SURGERY

## 2025-08-13 PROCEDURE — 73130 X-RAY EXAM OF HAND: CPT | Performed by: ORTHOPAEDIC SURGERY

## 2025-08-18 ENCOUNTER — HOSPITAL ENCOUNTER (OUTPATIENT)
Dept: CT IMAGING | Age: 70
Discharge: HOME | End: 2025-08-18
Attending: FAMILY MEDICINE

## 2025-08-18 DIAGNOSIS — M54.16 LUMBAR RADICULOPATHY: ICD-10-CM

## 2025-08-18 PROCEDURE — 72131 CT LUMBAR SPINE W/O DYE: CPT | Performed by: FAMILY MEDICINE

## 2025-08-19 ENCOUNTER — RESULTS FOLLOW-UP (OUTPATIENT)
Dept: FAMILY MEDICINE CLINIC | Facility: CLINIC | Age: 70
End: 2025-08-19

## 2025-08-22 ENCOUNTER — PATIENT MESSAGE (OUTPATIENT)
Dept: ORTHOPEDICS CLINIC | Facility: CLINIC | Age: 70
End: 2025-08-22

## 2025-08-22 ENCOUNTER — TELEPHONE (OUTPATIENT)
Dept: ORTHOPEDICS CLINIC | Facility: CLINIC | Age: 70
End: 2025-08-22

## 2025-08-25 ENCOUNTER — OFFICE VISIT (OUTPATIENT)
Dept: FAMILY MEDICINE CLINIC | Facility: CLINIC | Age: 70
End: 2025-08-25

## 2025-08-25 VITALS
HEART RATE: 70 BPM | WEIGHT: 157.19 LBS | HEIGHT: 65 IN | OXYGEN SATURATION: 98 % | TEMPERATURE: 98 F | RESPIRATION RATE: 16 BRPM | SYSTOLIC BLOOD PRESSURE: 112 MMHG | BODY MASS INDEX: 26.19 KG/M2 | DIASTOLIC BLOOD PRESSURE: 64 MMHG

## 2025-08-25 DIAGNOSIS — M54.16 LUMBAR RADICULOPATHY: Primary | ICD-10-CM

## 2025-08-25 PROCEDURE — 99213 OFFICE O/P EST LOW 20 MIN: CPT | Performed by: FAMILY MEDICINE

## 2025-08-27 ENCOUNTER — HOSPITAL ENCOUNTER (OUTPATIENT)
Dept: GENERAL RADIOLOGY | Age: 70
Discharge: HOME | End: 2025-08-27
Attending: ORTHOPAEDIC SURGERY

## 2025-08-27 ENCOUNTER — OFFICE VISIT (OUTPATIENT)
Dept: ORTHOPEDICS CLINIC | Facility: CLINIC | Age: 70
End: 2025-08-27

## 2025-08-27 VITALS — WEIGHT: 157 LBS | HEIGHT: 65 IN | BODY MASS INDEX: 26.16 KG/M2

## 2025-08-27 DIAGNOSIS — M65.332 TRIGGER MIDDLE FINGER OF LEFT HAND: Primary | ICD-10-CM

## 2025-08-27 DIAGNOSIS — M65.342 TRIGGER RING FINGER OF LEFT HAND: ICD-10-CM

## 2025-08-27 DIAGNOSIS — S62.317A CLOSED DISPLACED FRACTURE OF BASE OF FIFTH METACARPAL BONE OF LEFT HAND, INITIAL ENCOUNTER: ICD-10-CM

## 2025-08-27 PROCEDURE — 20550 NJX 1 TENDON SHEATH/LIGAMENT: CPT | Performed by: ORTHOPAEDIC SURGERY

## 2025-08-27 PROCEDURE — 73130 X-RAY EXAM OF HAND: CPT | Performed by: ORTHOPAEDIC SURGERY

## 2025-08-27 PROCEDURE — 99214 OFFICE O/P EST MOD 30 MIN: CPT | Performed by: ORTHOPAEDIC SURGERY

## 2025-08-27 RX ORDER — BETAMETHASONE SODIUM PHOSPHATE AND BETAMETHASONE ACETATE 3; 3 MG/ML; MG/ML
12 INJECTION, SUSPENSION INTRA-ARTICULAR; INTRALESIONAL; INTRAMUSCULAR; SOFT TISSUE ONCE
Status: COMPLETED | OUTPATIENT
Start: 2025-08-27 | End: 2025-08-27

## 2025-08-27 RX ADMIN — BETAMETHASONE SODIUM PHOSPHATE AND BETAMETHASONE ACETATE 12 MG: 3; 3 INJECTION, SUSPENSION INTRA-ARTICULAR; INTRALESIONAL; INTRAMUSCULAR; SOFT TISSUE at 09:59:00

## (undated) DIAGNOSIS — R53.83 FATIGUE, UNSPECIFIED TYPE: Primary | ICD-10-CM

## (undated) DIAGNOSIS — R93.89 ABNORMAL CXR (CHEST X-RAY): Primary | ICD-10-CM

## (undated) DEVICE — 10FT COMBINED O2 DELIVERY/CO2 MONITORING. FILTER WITH MICROSTREAM TYPE LUER: Brand: DUAL ADULT NASAL CANNULA

## (undated) DEVICE — TRAP POLYP W/ 2 SPEC TY CLR MAGNIFYING WIND

## (undated) DEVICE — LASSO POLYPECTOMY SNARE: Brand: LASSO

## (undated) DEVICE — NET SPEC W3XL6CM TIP DIA2.5MM CATH L230CM

## (undated) DEVICE — SNARE CAPTI HEX STIFF MEDIUM

## (undated) DEVICE — 3M™ RED DOT™ MONITORING ELECTRODE WITH FOAM TAPE AND STICKY GEL, 50/BAG, 20/CASE, 72/PLT 2570: Brand: RED DOT™

## (undated) DEVICE — 1200CC GUARDIAN II: Brand: GUARDIAN

## (undated) DEVICE — BITEBLOCK ENDOSCP 60FR MAXI STRP

## (undated) DEVICE — STERIS KITS

## (undated) DEVICE — REM POLYHESIVE ADULT PATIENT RETURN ELECTRODE: Brand: VALLEYLAB

## (undated) DEVICE — KIT VLV 5 PC AIR H2O SUCT BX ENDOGATOR CONN

## (undated) DEVICE — GIJAW SINGLE-USE BIOPSY FORCEPS WITH NEEDLE: Brand: GIJAW

## (undated) NOTE — LETTER
5/23/2025    Jojo Yin  [address] [place] [address]                 To Whom It May Concern,                      This letter is to inform you that  has placed a urine lab order in at [facility] for your request on 5/20/25. Please go to your [facility] lab to get this completed. If you have any questions or would like to speak to the nurse, please call at [phone].                            Sincerely,    Tim Cee MD  [address] [place] [id number]  Ph: [phone]  Fax: [phone]

## (undated) NOTE — Clinical Note
I had the pleasure of seeing Leigh Ann Munoz on 8/3/2022. Please see my attached note.     Jesus Rodriguez MD FACS  [facility]